# Patient Record
Sex: MALE | Race: WHITE | Employment: FULL TIME | URBAN - METROPOLITAN AREA
[De-identification: names, ages, dates, MRNs, and addresses within clinical notes are randomized per-mention and may not be internally consistent; named-entity substitution may affect disease eponyms.]

---

## 2024-05-06 ENCOUNTER — HOSPITAL ENCOUNTER (INPATIENT)
Age: 59
DRG: 560 | End: 2024-05-06
Attending: STUDENT IN AN ORGANIZED HEALTH CARE EDUCATION/TRAINING PROGRAM | Admitting: STUDENT IN AN ORGANIZED HEALTH CARE EDUCATION/TRAINING PROGRAM
Payer: COMMERCIAL

## 2024-05-06 DIAGNOSIS — S22.009S CLOSED FRACTURE OF THORACIC SPINE WITHOUT SPINAL CORD LESION, SEQUELA: Primary | ICD-10-CM

## 2024-05-06 PROCEDURE — 6360000002 HC RX W HCPCS: Performed by: STUDENT IN AN ORGANIZED HEALTH CARE EDUCATION/TRAINING PROGRAM

## 2024-05-06 PROCEDURE — 1280000000 HC REHAB R&B

## 2024-05-06 PROCEDURE — 6370000000 HC RX 637 (ALT 250 FOR IP): Performed by: STUDENT IN AN ORGANIZED HEALTH CARE EDUCATION/TRAINING PROGRAM

## 2024-05-06 RX ORDER — METHOCARBAMOL 500 MG/1
1000 TABLET, FILM COATED ORAL 3 TIMES DAILY
Status: DISPENSED | OUTPATIENT
Start: 2024-05-06

## 2024-05-06 RX ORDER — GABAPENTIN 300 MG/1
600 CAPSULE ORAL 3 TIMES DAILY
Status: DISCONTINUED | OUTPATIENT
Start: 2024-05-06 | End: 2024-05-08

## 2024-05-06 RX ORDER — ACETAMINOPHEN 325 MG/1
650 TABLET ORAL EVERY 4 HOURS PRN
Status: ACTIVE | OUTPATIENT
Start: 2024-05-06

## 2024-05-06 RX ORDER — POLYETHYLENE GLYCOL 3350 17 G/17G
17 POWDER, FOR SOLUTION ORAL 2 TIMES DAILY
Status: DISPENSED | OUTPATIENT
Start: 2024-05-06

## 2024-05-06 RX ORDER — CARVEDILOL 6.25 MG/1
6.25 TABLET ORAL 2 TIMES DAILY WITH MEALS
Status: DISPENSED | OUTPATIENT
Start: 2024-05-06

## 2024-05-06 RX ORDER — OXYCODONE HYDROCHLORIDE 5 MG/1
10 TABLET ORAL EVERY 4 HOURS PRN
Status: DISPENSED | OUTPATIENT
Start: 2024-05-06

## 2024-05-06 RX ORDER — BISACODYL 10 MG
10 SUPPOSITORY, RECTAL RECTAL DAILY PRN
Status: ACTIVE | OUTPATIENT
Start: 2024-05-06

## 2024-05-06 RX ORDER — SENNA AND DOCUSATE SODIUM 50; 8.6 MG/1; MG/1
1 TABLET, FILM COATED ORAL 2 TIMES DAILY
Status: DISPENSED | OUTPATIENT
Start: 2024-05-06

## 2024-05-06 RX ORDER — ACETAMINOPHEN 500 MG
1000 TABLET ORAL EVERY 8 HOURS SCHEDULED
Status: DISPENSED | OUTPATIENT
Start: 2024-05-06

## 2024-05-06 RX ORDER — ONDANSETRON 4 MG/1
4 TABLET, ORALLY DISINTEGRATING ORAL EVERY 8 HOURS PRN
Status: DISPENSED | OUTPATIENT
Start: 2024-05-06

## 2024-05-06 RX ORDER — OXYCODONE HYDROCHLORIDE 5 MG/1
5 TABLET ORAL EVERY 4 HOURS PRN
Status: DISPENSED | OUTPATIENT
Start: 2024-05-06

## 2024-05-06 RX ORDER — ENOXAPARIN SODIUM 100 MG/ML
40 INJECTION SUBCUTANEOUS 2 TIMES DAILY
Status: DISCONTINUED | OUTPATIENT
Start: 2024-05-06 | End: 2024-05-10

## 2024-05-06 RX ORDER — HYDRALAZINE HYDROCHLORIDE 10 MG/1
10 TABLET, FILM COATED ORAL EVERY 6 HOURS PRN
Status: ACTIVE | OUTPATIENT
Start: 2024-05-06

## 2024-05-06 RX ORDER — MECOBALAMIN 5000 MCG
5 TABLET,DISINTEGRATING ORAL NIGHTLY PRN
Status: DISPENSED | OUTPATIENT
Start: 2024-05-06

## 2024-05-06 RX ADMIN — OXYCODONE 10 MG: 5 TABLET ORAL at 17:38

## 2024-05-06 RX ADMIN — Medication 5 MG: at 21:06

## 2024-05-06 RX ADMIN — OXYCODONE 10 MG: 5 TABLET ORAL at 21:53

## 2024-05-06 RX ADMIN — METHOCARBAMOL 1000 MG: 500 TABLET ORAL at 21:02

## 2024-05-06 RX ADMIN — ACETAMINOPHEN 1000 MG: 500 TABLET ORAL at 21:01

## 2024-05-06 RX ADMIN — SENNOSIDES AND DOCUSATE SODIUM 1 TABLET: 50; 8.6 TABLET ORAL at 21:02

## 2024-05-06 RX ADMIN — POLYETHYLENE GLYCOL 3350 17 G: 17 POWDER, FOR SOLUTION ORAL at 21:01

## 2024-05-06 RX ADMIN — GABAPENTIN 600 MG: 300 CAPSULE ORAL at 21:02

## 2024-05-06 RX ADMIN — ENOXAPARIN SODIUM 40 MG: 100 INJECTION SUBCUTANEOUS at 21:05

## 2024-05-06 RX ADMIN — CARVEDILOL 6.25 MG: 6.25 TABLET, FILM COATED ORAL at 17:39

## 2024-05-06 ASSESSMENT — PAIN DESCRIPTION - DESCRIPTORS: DESCRIPTORS: TIGHTNESS

## 2024-05-06 ASSESSMENT — PAIN SCALES - GENERAL
PAINLEVEL_OUTOF10: 7
PAINLEVEL_OUTOF10: 8
PAINLEVEL_OUTOF10: 7

## 2024-05-06 ASSESSMENT — PAIN DESCRIPTION - LOCATION
LOCATION: BACK

## 2024-05-06 NOTE — PROGRESS NOTES
NURSING ASSESSMENT: ARU ADMISSION  [facility]    Patient:Jd Huertas     Rehab Dx/Hx: Closed fracture of thoracic spine without spinal cord lesion, sequela [S22.009S]   :1965  MRN:[id number]  Date of Admit: 2024  Room #: 0162/0162-01    Subjective:   Patient admitted to [id number] from  via stretcher.   Alert and oriented x4.  Oriented to room and call light system. Oriented to rehab routine and therapy schedules. Informed about care conferences and ordering of meals with PCA.    Drug / Medication Review:   Medications were reviewed by RN at time of admission  [x]  No potential or actual clinically significant medication issues were noted.   []  Potential or actual clinically significant medication issues were found and MD was notified. (Specified below if applicable)   []  Allergy to medication   []  Drug interactions (drug/drug, drug/food, drug/disease interactions)   []  Duplicate drug   []  Omission (drug missing from prescribed regimen)   []  Non adherence   []  Adverse reaction   []  Wrong patient, drug, dose, route, time error   []  Ineffective drug therapy    Section GG: Rolling Right and Left   [x]  Code 06, Independent: if the patient completes the activity by themself with no assistance from a helper.   []  Code 05, Setup or clean up assist: if the helper sets up or cleans up; patient completes activity   []  Code 04, Supervision or touching assist: If the helper provides verbal cues and/or touching/steadying and/or contact guard assistance as patient completes activity   []  Code 03, Partial/Moderate Assist: If the helper does LESS THAN HALF the effort. [place] lifts, holds, or supports trunk or limbs, but provides less than half the effort.   []  Code 02, Substantial/Maximal Assist: if the helper does MORE THAN HALF the effort. [place] lifts or holds trunk or limbs and provides more than half the effort.  []  Code 01,Dependent: If the helper does ALL of the effort.

## 2024-05-06 NOTE — PROGRESS NOTES
IRF TORI Admission Assessment  [facility] Acute Rehab Unit    Patient:Jd Huertas     Rehab Dx/Hx: Closed fracture of thoracic spine without spinal cord lesion, sequela [S22.009S]   :1965  MRN:[id number]  Date of Admit: 2024     Pain Effect on Sleep:  Over the past 5 days, how much of the time has pain made it hard for you to sleep at night?  []  0. Does not apply - I have not had any pain or hurting in the past 5 days  [x]  1. Rarely or not at all  []  2. Occasionally  []  3. Frequently  []  4. Almost constantly  []  8. Unable to answer    Over the past 5 days, how often have you limited your participation in rehabilitation therapy sessions due to pain?  []  0. Does not apply - I have not received rehabilitation therapy in the past 5 days  [x]  1. Rarely or not at all  []  2. Occasionally  []  3. Frequently  []  4. Almost constantly  []  8. Unable to answer    Pain Interference with Day-to-Day Activities:  Over the past 5 days, how often have you limited your day-to-day activities (excluding rehabilitation therapy session)?  [x]  1. Rarely or not at all  []  2. Occasionally  []  3. Frequently  []  4. Almost constantly  []  8. Unable to answer      High-Risk Drug Classes: Use and Indication   Is taking: Check if the pt is taking any medications by pharmacological classification  Indication noted: If column 1 is checked, check if there is an indication noted for all meds in the drug class Is taking  (check all that apply) Indication noted (check all that apply)   Antipsychotic [] []   Anticoagulant [x] [x]   Antibiotic [] []   Opioid [x] [x]   Antiplatelet [] []   Hypoglycemic (including insulin) [] []   None of the above [] []     Special Treatments, Procedures, and Programs   Check all of the following treatments, procedures, and programs that apply on admission.  On admission (check all that apply)   Cancer Treatments    A1. Chemotherapy []   A2. IV []   A3. Oral []   A10. Other []   B1. Radiation

## 2024-05-06 NOTE — PLAN OF CARE
ARU PATIENT TREATMENT PLAN  [facility]  [address] [place]  [address]  [phone]    Jd Huertas    : 1965  [facility]: [id number]  MRN: [id number]   PHYSICIAN:  Heavenly Luna MD  Primary Problem    Patient Active Problem List   Diagnosis    Closed fracture of thoracic spine without spinal cord lesion, sequela       Rehabilitation Diagnosis:     Closed fracture of thoracic spine without spinal cord lesion, sequela [S22.009S]       ADMIT DATE:2024    Patient Goals: \"to get strong enough to go home\"     Admitting Impairments: Pt. Admitted d/t spinal fracture resulting in Decreased functional mobility ;Decreased safe awareness;Decreased ADL status;Decreased endurance;Decreased strength;Decreased sensation;Decreased high-level IADLs   Barriers: level of assistance, availability of assistance, endurance, pain   Participation: Good     CARE PLAN     NURSING:  Jd Huertas while on this unit will:     [x] Be continent of bowel and bladder     [x] Have an adequate number of bowel movements  [] Urinate with no urinary retention >300ml in bladder  [] Complete bladder protocol with [place] removal  [] Maintain O2 SATs at ___%  [x] Have pain managed while on ARU       [] Be pain free by discharge   [x] Have no skin breakdown while on ARU  [] Have improved skin integrity via wound measurements  [x] Have no signs/symptoms of infection at the wound site  [x] Be free from injury during hospitalization   [x] Complete education with patient/family with understanding demonstrated for:  [x] Adjustment   [x] Other:   Nursing interventions may include bowel/bladder training, education for medical assistive devices, medication education, O2 saturation management, energy conservation, stress management techniques, fall prevention, alarms protocol, seating and positioning, skin/wound care, pressure relief instruction,dressing changes,  infection protection, DVT prophylaxis, and/or  SPV  Short Term Goal 5: Pt will complete simple IADL SPV :  Long Term Goals  Time Frame for Long Term Goals : 14 days (5/19/24)  Long Term Goal 1: Pt will complete functional/toilet transfer mod I  Long Term Goal 2: Pt will complete TB dressing mod I  Long Term Goal 3: Pt will complete TB bathing mod I  Long Term Goal 4: Pt will tolerate 7 min stand mod I  Long Term Goal 5: Pt will complete simple IADL mod I :    These goals were reviewed with this patient at the time of assessment and Jd Huertas is in agreement    Plan of Care:  Pt to be seen 5 out of 7 days per week, 90   mins (exact) per day for 14 days (exact)  Current Treatment Recommendations: Strengthening, Balance training, Functional mobility training, Endurance training, Safety education & training, Pain management, Patient/Caregiver education & training, Self-Care / ADL, Home management training      SPEECH THERAPY: Goals will be left blank if speech is not following this patient.  Goals:           CASE MANAGEMENT:  Goals:   Assist patient/family with discharge planning, patient/family counseling,   and coordination with insurance during ARU stay.    QIM / IRF TORI SCORES:  ITEM CURRENT SCORE GOAL   Eating CARE Score: 6 Discharge Goal: Independent   Oral Hygiene CARE Score: 4 Discharge Goal: Independent   Toileting Hygiene CARE Score: 4 Discharge Goal: Independent   Shower/Bathe Self CARE Score: 3 Discharge Goal: Independent   Upper Body Dressing CARE Score: 4 Discharge Goal: Independent   Lower Body Dressing CARE Score: 2 Discharge Goal: Independent   On/Off Footwear CARE Score: 2 Discharge Goal: Independent   Roll Left & Right CARE Score: 4 Discharge Goal: Independent   Sit to Lying  CARE Score: 3 Discharge Goal: Independent   Lying to Sitting EOB CARE Score: 3 Discharge Goal: Independent   Sit to Stand CARE Score: 3 Discharge Goal: Independent   Chair/Bed to Chair Transfer CARE Score: 3 Discharge Goal: Independent   Toilet Transfer CARE Score: 4

## 2024-05-07 LAB
ANION GAP SERPL CALCULATED.3IONS-SCNC: 5 MMOL/L (ref 3–16)
BASOPHILS # BLD: 0 K/UL (ref 0–0.2)
BASOPHILS NFR BLD: 0.3 %
BUN SERPL-MCNC: 20 MG/DL (ref 7–20)
CALCIUM SERPL-MCNC: 8.8 MG/DL (ref 8.3–10.6)
CHLORIDE SERPL-SCNC: 98 MMOL/L (ref 99–110)
CO2 SERPL-SCNC: 31 MMOL/L (ref 21–32)
CREAT SERPL-MCNC: 0.6 MG/DL (ref 0.9–1.3)
DEPRECATED RDW RBC AUTO: 15.1 % (ref 12.4–15.4)
EOSINOPHIL # BLD: 0.3 K/UL (ref 0–0.6)
EOSINOPHIL NFR BLD: 3 %
GFR SERPLBLD CREATININE-BSD FMLA CKD-EPI: >90 ML/MIN/{1.73_M2}
GLUCOSE SERPL-MCNC: 105 MG/DL (ref 70–99)
HCT VFR BLD AUTO: 36.3 % (ref 40.5–52.5)
HGB BLD-MCNC: 11.8 G/DL (ref 13.5–17.5)
LYMPHOCYTES # BLD: 1.6 K/UL (ref 1–5.1)
LYMPHOCYTES NFR BLD: 17.6 %
MCH RBC QN AUTO: 27.7 PG (ref 26–34)
MCHC RBC AUTO-ENTMCNC: 32.6 G/DL (ref 31–36)
MCV RBC AUTO: 84.8 FL (ref 80–100)
MONOCYTES # BLD: 1 K/UL (ref 0–1.3)
MONOCYTES NFR BLD: 11 %
NEUTROPHILS # BLD: 6.1 K/UL (ref 1.7–7.7)
NEUTROPHILS NFR BLD: 68.1 %
PLATELET # BLD AUTO: 630 K/UL (ref 135–450)
PMV BLD AUTO: 6.8 FL (ref 5–10.5)
POTASSIUM SERPL-SCNC: 4.9 MMOL/L (ref 3.5–5.1)
RBC # BLD AUTO: 4.28 M/UL (ref 4.2–5.9)
SODIUM SERPL-SCNC: 134 MMOL/L (ref 136–145)
WBC # BLD AUTO: 9 K/UL (ref 4–11)

## 2024-05-07 PROCEDURE — 97530 THERAPEUTIC ACTIVITIES: CPT

## 2024-05-07 PROCEDURE — 6370000000 HC RX 637 (ALT 250 FOR IP): Performed by: STUDENT IN AN ORGANIZED HEALTH CARE EDUCATION/TRAINING PROGRAM

## 2024-05-07 PROCEDURE — 97166 OT EVAL MOD COMPLEX 45 MIN: CPT

## 2024-05-07 PROCEDURE — 6360000002 HC RX W HCPCS: Performed by: STUDENT IN AN ORGANIZED HEALTH CARE EDUCATION/TRAINING PROGRAM

## 2024-05-07 PROCEDURE — 97116 GAIT TRAINING THERAPY: CPT

## 2024-05-07 PROCEDURE — 97162 PT EVAL MOD COMPLEX 30 MIN: CPT

## 2024-05-07 PROCEDURE — 97535 SELF CARE MNGMENT TRAINING: CPT

## 2024-05-07 PROCEDURE — 85025 COMPLETE CBC W/AUTO DIFF WBC: CPT

## 2024-05-07 PROCEDURE — 80048 BASIC METABOLIC PNL TOTAL CA: CPT

## 2024-05-07 PROCEDURE — 36415 COLL VENOUS BLD VENIPUNCTURE: CPT

## 2024-05-07 PROCEDURE — 1280000000 HC REHAB R&B

## 2024-05-07 RX ADMIN — ENOXAPARIN SODIUM 40 MG: 100 INJECTION SUBCUTANEOUS at 21:13

## 2024-05-07 RX ADMIN — ACETAMINOPHEN 1000 MG: 500 TABLET ORAL at 21:14

## 2024-05-07 RX ADMIN — CARVEDILOL 6.25 MG: 6.25 TABLET, FILM COATED ORAL at 16:41

## 2024-05-07 RX ADMIN — CARVEDILOL 6.25 MG: 6.25 TABLET, FILM COATED ORAL at 08:32

## 2024-05-07 RX ADMIN — OXYCODONE 10 MG: 5 TABLET ORAL at 09:45

## 2024-05-07 RX ADMIN — GABAPENTIN 600 MG: 300 CAPSULE ORAL at 21:14

## 2024-05-07 RX ADMIN — ACETAMINOPHEN 1000 MG: 500 TABLET ORAL at 05:41

## 2024-05-07 RX ADMIN — GABAPENTIN 600 MG: 300 CAPSULE ORAL at 14:22

## 2024-05-07 RX ADMIN — OXYCODONE HYDROCHLORIDE 5 MG: 5 TABLET ORAL at 21:17

## 2024-05-07 RX ADMIN — POLYETHYLENE GLYCOL 3350 17 G: 17 POWDER, FOR SOLUTION ORAL at 08:32

## 2024-05-07 RX ADMIN — POLYETHYLENE GLYCOL 3350 17 G: 17 POWDER, FOR SOLUTION ORAL at 21:14

## 2024-05-07 RX ADMIN — OXYCODONE 10 MG: 5 TABLET ORAL at 14:22

## 2024-05-07 RX ADMIN — ACETAMINOPHEN 1000 MG: 500 TABLET ORAL at 14:22

## 2024-05-07 RX ADMIN — SENNOSIDES AND DOCUSATE SODIUM 1 TABLET: 50; 8.6 TABLET ORAL at 08:33

## 2024-05-07 RX ADMIN — SENNOSIDES AND DOCUSATE SODIUM 1 TABLET: 50; 8.6 TABLET ORAL at 21:14

## 2024-05-07 RX ADMIN — Medication 5 MG: at 21:18

## 2024-05-07 RX ADMIN — METHOCARBAMOL 1000 MG: 500 TABLET ORAL at 14:22

## 2024-05-07 RX ADMIN — GABAPENTIN 600 MG: 300 CAPSULE ORAL at 08:33

## 2024-05-07 RX ADMIN — ENOXAPARIN SODIUM 40 MG: 100 INJECTION SUBCUTANEOUS at 08:33

## 2024-05-07 RX ADMIN — OXYCODONE 10 MG: 5 TABLET ORAL at 05:41

## 2024-05-07 RX ADMIN — OXYCODONE 10 MG: 5 TABLET ORAL at 01:48

## 2024-05-07 RX ADMIN — METHOCARBAMOL 1000 MG: 500 TABLET ORAL at 21:14

## 2024-05-07 RX ADMIN — METHOCARBAMOL 1000 MG: 500 TABLET ORAL at 08:33

## 2024-05-07 ASSESSMENT — PAIN SCALES - GENERAL
PAINLEVEL_OUTOF10: 8
PAINLEVEL_OUTOF10: 6
PAINLEVEL_OUTOF10: 7
PAINLEVEL_OUTOF10: 7
PAINLEVEL_OUTOF10: 6

## 2024-05-07 ASSESSMENT — PAIN DESCRIPTION - DESCRIPTORS
DESCRIPTORS: DISCOMFORT;SORE
DESCRIPTORS: DISCOMFORT
DESCRIPTORS: DISCOMFORT;SORE

## 2024-05-07 ASSESSMENT — PAIN DESCRIPTION - PAIN TYPE: TYPE: CHRONIC PAIN

## 2024-05-07 ASSESSMENT — PAIN - FUNCTIONAL ASSESSMENT: PAIN_FUNCTIONAL_ASSESSMENT: ACTIVITIES ARE NOT PREVENTED

## 2024-05-07 ASSESSMENT — PAIN DESCRIPTION - FREQUENCY: FREQUENCY: CONTINUOUS

## 2024-05-07 ASSESSMENT — PAIN DESCRIPTION - ONSET: ONSET: ON-GOING

## 2024-05-07 ASSESSMENT — PAIN DESCRIPTION - LOCATION
LOCATION: BACK

## 2024-05-07 NOTE — CARE COORDINATION
Case Management ARU Admission Assessment   [facility]    Patient:Jd Stoddard     Rehab Dx/Hx: Closed fracture of thoracic spine without spinal cord lesion, sequela [S22.009S]   :1965  MRN:[id number]  Date of Admit: 2024  Room #: 0162/0162-01       Objective:  met with the patient to complete initial assessment and review the role of Case Management while on the ARU. Patient educated on team conferences. Discussed family training with the patient/family on how it is encouraged on the unit. Order for \"discharge planning\" has been addressed.          Family Present: no   Primary : [facility] Decision Maker:   Primary Decision Maker: BECCA STODDARD - Child - [phone]    Secondary Decision Maker: YSABEL STODDARD  Child - [phone]   Current PCP:  No PCP listed will provide information       Admit date:  2024   Insurance: [facility] C-9   Precert required for SNF:  [] No       [] Yes   3 Night stay required: [] No       [] Yes   Admitting diagnosis: Closed fracture of thoracic spine without spinal cord lesion, sequela [S22.009S]       Current home situation: 59 yr old male. Dx:Closed fracture of thoracic spine without spinal cord lesion, sequela. Independent PLOF. Lives with son and daughter in a 2 level home with 1 step to enter.     DME at home: [] Walker     [] Cane       [] RTS        [] BSC      [] Shower Chair        [] O2       [] HHN     [] CPAP       [] BiPap      [] Hospital Bed       [] W/C        [] Other:    Medical concerns requiring training: Restrictions  Restrictions/Precautions: Fall Risk;Up as Tolerated;Surgical Protocols  Other position/activity restrictions: no BLT, no brace  Required Braces or Orthoses?: No   Medication concerns:  Require financial assistance with meds? [x] No       [] If yes, please explain:   [] Yes              Services prior to admission: none   Preference for HHC or OP Therapy: [] Home health       [x] Outpatient

## 2024-05-07 NOTE — PROGRESS NOTES
Occupational Therapy  Facility/Department: [facility]  Rehabilitation Occupational Therapy Evaluation       Date: 24  Patient Name: Jd Huertas       Room: 0162/0162-01  MRN: [id number]  Account: [id number]   : 1965  (59 y.o.) Gender: male     Referring Practitioner: Cheryl  Diagnosis: Closed fracture of thoracic spine without spinal cord lesion, sequela       Restrictions  Restrictions/Precautions: Fall Risk;Up as Tolerated;Surgical Protocols  Other position/activity restrictions: no BLT, no brace  Required Braces or Orthoses?: No    Subjective   Pt side-lying in bed and agreeable to OT evaluation.           Vitals  Temp: 97.7 °F (36.5 °C)  Pulse: 76  Respirations: 18  BP: (!) 143/76  Oxygen Therapy  SpO2: 97 %  O2 Device: None (Room air)       Objective     Cognition  Overall Cognitive Status: WFL  Orientation  Overall Orientation Status: Within Functional Limits  Orientation Level: Oriented X4   Perception  Overall Perceptual Status: WFL  Sensation  Overall Sensation Status: Impaired (L buttocks n/t)   ROM  LUE AROM (degrees)  LUE AROM : WFL  RUE AROM (degrees)  RUE AROM : WFL  LUE Strength  Gross LUE Strength: WNL  RUE Strength  Gross RUE Strength: WNL  Fine Motor Skills  Coordination  Movements Are Fluid And Coordinated: Yes       Hand Assessment     Functional Mobility  Transfers  Stand Step Transfers: Stand by assistance  Sit to stand: Stand by assistance  Stand to sit: Stand by assistance  Transfer Comments: with RW  Toilet Transfers  Toilet - Technique: Stand step  Equipment Used: Standard toilet  Toilet Transfer: Stand by assistance  Toilet Transfers Comments: with RW and grab bars  Shower Transfers  Shower - Transfer From: Walker  Shower - Transfer Type: To and From  Shower - Transfer To: Shower seat with back  Shower - Technique: Ambulating  Shower Transfers: Stand by assistance  Shower Transfers Comments: use of grab bars    Social/Functional History  Lives With: Son;Daughter  Type of

## 2024-05-07 NOTE — PROGRESS NOTES
Physical Therapy  Facility/Department: [facility]  Rehabilitation Physical Therapy Initial Assessment    NAME: Jd Huertas  : 1965 (59 y.o.)  MRN: [id number]  CODE STATUS: Full Code    Date of Service: 24  No past medical history on file.      Chart Reviewed: Yes  Patient assessed for rehabilitation services?: Yes  Family / Caregiver Present: No  Referring Practitioner: Heavenly Luna MD  Referral Date : 24  Diagnosis: Per chart: \"Jd Huertas is a 59 year old male without significant past medical history who presented to [facility] on 24 as a trauma after falling 15 feet onto a metal beam on his back while at work. He was found to have T5 and T8 compression fractures, T12 burst fracture with retropulsion causing spinal canal stenosis. Neurosurgery was consulted and on 24 he underwent T10-L2 PDFF. Incidentally a AAA was found requiring no surgical intervention. He was started on a BB and recommended for follow up CT in 6 months. CT head also showed small chronic lacunar infarct within the right caudate. Post operative course was complicated by ileus requiring NG for decompression which has since been removed. He was admitted to [facility] on 24 due to functional deficits below his baseline. Today he reports continued back pain which he reports is currently a 6 on a scale from 1-10. He reports some numbness over his left flank, buttock, and upper thigh. He denies focal weakness. His last bowel movement was Saturday. He denies urinary complaints. He lives in a 2 level house with 1 [facility] with his two teenaged children.\"  General Comment  Comments: RN cleared, pt in bed asleep on arrival. Agreeable to therapy.    Restrictions:  Restrictions/Precautions: Fall Risk;Up as Tolerated;Surgical Protocols  Position Activity Restriction  Spinal Precautions: No Bending;No Lifting;No Twisting  Other position/activity restrictions: no BLT, no brace     SUBJECTIVE  Subjective: Pt reports, \"I just want to get  PT, spinal precautions, safe transfers  Education Method: Verbal;Demonstration  Barriers to Learning: None  Education Outcome: Verbalized understanding;Continued education needed         Therapy Time   Individual Concurrent Group Co-treatment   Time In 1230         Time Out 1400         Minutes 90           Timed Code Treatment Minutes: 75 Minutes (15 min eval)       Maria C Finney, PT, 05/07/24 at 2:58 PM

## 2024-05-07 NOTE — PATIENT CARE CONFERENCE
[facility]  Inpatient Rehabilitation  Weekly Team Conference Note    Date: 2024  Patient Name: Jd Huertas        MRN: [id number]    : 1965  (59 y.o.)  Gender: male   Referring Practitioner: Heavenly Luna MD  Diagnosis: Per chart: \"Jd Huertas is a 59 year old male without significant past medical history who presented to [facility] on 24 as a trauma after falling 15 feet onto a metal beam on his back while at work. He was found to have T5 and T8 compression fractures, T12 burst fracture with retropulsion causing spinal canal stenosis. Neurosurgery was consulted and on 24 he underwent T10-L2 PDFF. Incidentally a AAA was found requiring no surgical intervention. He was started on a BB and recommended for follow up CT in 6 months. CT head also showed small chronic lacunar infarct within the right caudate. Post operative course was complicated by ileus requiring NG for decompression which has since been removed. He was admitted to [facility] on 24 due to functional deficits below his baseline. Today he reports continued back pain which he reports is currently a 6 on a scale from 1-10. He reports some numbness over his left flank, buttock, and upper thigh. He denies focal weakness. His last bowel movement was Saturday. He denies urinary complaints. He lives in a 2 level house with 1 MAX with his two teenaged children.\"      Interventions to be utilized toward barriers to discharge, per discipline:  NURSING  Nursing observed barriers to dc: Pain, Limited family support, Lower extremity weakness, and Medication managment  Nursing interventions: medication management, assist with ADL's, education  Family Education: No  Fall Risk:  Yes    Stay with me?: No    PHYSICAL THERAPY  Physical therapy observed barriers to dc:    Baseline: Independent with mobility, 2 story home, 1 MAX  Current level: Min A bed mobility & transfers, Mod A to walk without RW 20 ft, Min A with RW

## 2024-05-07 NOTE — H&P
Patient: Jd Huertas  [id number]  Date: 5/7/2024      Chief Complaint: back pain    History of Present Illness/Hospital Course:  Jd Huertas is a 59 year old male without significant past medical history who presented to [facility] on 4/22/24 as a trauma after falling 15 feet onto a metal beam on his back while at work. He was found to have T5 and T8 compression fractures, T12 burst fracture with retropulsion causing spinal canal stenosis. Neurosurgery was consulted and on 4/24/24 he underwent T10-L2 PDFF. Incidentally a AAA was found requiring no surgical intervention. He was started on a BB and recommended for follow up CT in 6 months. CT head also showed small chronic lacunar infarct within the right caudate. Post operative course was complicated by ileus requiring NG for decompression which has since been removed. He was admitted to [facility] on 5/6/24 due to functional deficits below his baseline. Today he reports continued back pain which he reports is currently a 6 on a scale from 1-10. He reports some numbness over his left flank, buttock, and upper thigh. He denies focal weakness. His last bowel movement was Saturday. He denies urinary complaints. He lives in a 2 level house with 1 [facility] with his two teenaged children.     Prior Level of Function:  Independent for self care, indoor mobility, stairs, functional cognition     Current Level of Function:  Supervision to roll left and right  Mod assist for sit to lying, lying to sitting on side of bed, sit to stand, chair/bed transfer, walk 10 feet  Max assist for lower body dressing     has no past medical history on file.     has no past surgical history on file.     reports that he has been smoking cigarettes. He started smoking about 30 years ago. He has a 30.0 pack-year smoking history. He has never used smokeless tobacco. He reports that he does not drink alcohol and does not use drugs.    family history is not on file.    Current Facility-Administered Medications  septum: Agitated saline contrast study at baseline or with     provocation, shows a trivialright-to-left atrial level shunt.   - Pericardium, extracardiac: A small pericardial effusion is identified.     Most recent EKG revealed NSR.     IMAGING    LLE Venous Duplex 5/3/24  Normal LLE venous duplex exam.     XR Chest 4/30/24  Stable airspace disease status post extubation.     MRI T and L Spine WO contrast 4/22/24  Thoracic vertebral body disc space height and alignment maintained T1-T4, T6-T11. A wedge compression deformity T5 similar loss of height compared to CT, minimal retropulsion adjacent to interspace dorsally with thecal sac but no spinal cord compression.   T12 burst fracture similar to MR, with 60% loss of height midline, retropulsion of dorsal cortex approximately 6 mm with chiefly left lateral thecal sac compression and minimal spinal cord effacement.   Lumbar vertebrae and disc spaces normal.     The above laboratory data have been reviewed.   The above imaging data have been reviewed.  The above medical testing have been reviewed.    Body mass index is 25.4 kg/m².    Barriers to Discharge: level of assistance, availability of assistance, endurance, pain    POST ADMISSION PHYSICIAN EVALUATION  The patient has agreed to being admitted to our comprehensive inpatient rehabilitation facility consisting of at least 180 minutes of therapy a day, 5 out of 7 days a week.     The patient/family has a good understanding of our discharge process. The patient has potential to make improvement and is in need of at least two of the following multidisciplinary therapies including but not limited to physical, occupational, respiratory, and speech, nutritional services, wound care, and prosthetics and orthotics. Given the patients complex condition and risk of further medical complications, rehabilitation services cannot be safely provided at a lower level of care such as a skilled nursing facility. I have compared the  provided prn.     PPX  DVT: lovenox  GI: not indicated    Follow up appointments: Neurosurgery, CT Surgery, PCP    Heavenly Luna MD 5/7/2024, 10:07 AM

## 2024-05-08 ENCOUNTER — APPOINTMENT (OUTPATIENT)
Dept: GENERAL RADIOLOGY | Age: 59
DRG: 560 | End: 2024-05-08
Attending: STUDENT IN AN ORGANIZED HEALTH CARE EDUCATION/TRAINING PROGRAM
Payer: COMMERCIAL

## 2024-05-08 LAB
ALBUMIN SERPL-MCNC: 3.4 G/DL (ref 3.4–5)
ALBUMIN/GLOB SERPL: 1 {RATIO} (ref 1.1–2.2)
ALP SERPL-CCNC: 158 U/L (ref 40–129)
ALT SERPL-CCNC: 32 U/L (ref 10–40)
ANION GAP SERPL CALCULATED.3IONS-SCNC: 8 MMOL/L (ref 3–16)
AST SERPL-CCNC: 21 U/L (ref 15–37)
BASOPHILS # BLD: 0 K/UL (ref 0–0.2)
BASOPHILS NFR BLD: 0.3 %
BILIRUB SERPL-MCNC: <0.2 MG/DL (ref 0–1)
BUN SERPL-MCNC: 20 MG/DL (ref 7–20)
CALCIUM SERPL-MCNC: 8.9 MG/DL (ref 8.3–10.6)
CHLORIDE SERPL-SCNC: 95 MMOL/L (ref 99–110)
CO2 SERPL-SCNC: 28 MMOL/L (ref 21–32)
CREAT SERPL-MCNC: 0.6 MG/DL (ref 0.9–1.3)
DEPRECATED RDW RBC AUTO: 15.1 % (ref 12.4–15.4)
EOSINOPHIL # BLD: 0.2 K/UL (ref 0–0.6)
EOSINOPHIL NFR BLD: 2 %
GFR SERPLBLD CREATININE-BSD FMLA CKD-EPI: >90 ML/MIN/{1.73_M2}
GLUCOSE SERPL-MCNC: 127 MG/DL (ref 70–99)
HCT VFR BLD AUTO: 36.4 % (ref 40.5–52.5)
HGB BLD-MCNC: 12.1 G/DL (ref 13.5–17.5)
LIPASE SERPL-CCNC: 22 U/L (ref 13–60)
LYMPHOCYTES # BLD: 1.4 K/UL (ref 1–5.1)
LYMPHOCYTES NFR BLD: 14.4 %
MCH RBC QN AUTO: 28 PG (ref 26–34)
MCHC RBC AUTO-ENTMCNC: 33.4 G/DL (ref 31–36)
MCV RBC AUTO: 83.8 FL (ref 80–100)
MONOCYTES # BLD: 0.7 K/UL (ref 0–1.3)
MONOCYTES NFR BLD: 7.6 %
NEUTROPHILS # BLD: 7.2 K/UL (ref 1.7–7.7)
NEUTROPHILS NFR BLD: 75.7 %
PLATELET # BLD AUTO: 688 K/UL (ref 135–450)
PMV BLD AUTO: 7.3 FL (ref 5–10.5)
POTASSIUM SERPL-SCNC: 4.2 MMOL/L (ref 3.5–5.1)
PROT SERPL-MCNC: 6.9 G/DL (ref 6.4–8.2)
RBC # BLD AUTO: 4.34 M/UL (ref 4.2–5.9)
SODIUM SERPL-SCNC: 131 MMOL/L (ref 136–145)
WBC # BLD AUTO: 9.5 K/UL (ref 4–11)

## 2024-05-08 PROCEDURE — 97530 THERAPEUTIC ACTIVITIES: CPT

## 2024-05-08 PROCEDURE — 97110 THERAPEUTIC EXERCISES: CPT

## 2024-05-08 PROCEDURE — 80053 COMPREHEN METABOLIC PANEL: CPT

## 2024-05-08 PROCEDURE — 1280000000 HC REHAB R&B

## 2024-05-08 PROCEDURE — 6370000000 HC RX 637 (ALT 250 FOR IP): Performed by: STUDENT IN AN ORGANIZED HEALTH CARE EDUCATION/TRAINING PROGRAM

## 2024-05-08 PROCEDURE — 6360000002 HC RX W HCPCS: Performed by: STUDENT IN AN ORGANIZED HEALTH CARE EDUCATION/TRAINING PROGRAM

## 2024-05-08 PROCEDURE — 36415 COLL VENOUS BLD VENIPUNCTURE: CPT

## 2024-05-08 PROCEDURE — 74018 RADEX ABDOMEN 1 VIEW: CPT

## 2024-05-08 PROCEDURE — 97535 SELF CARE MNGMENT TRAINING: CPT

## 2024-05-08 PROCEDURE — 85025 COMPLETE CBC W/AUTO DIFF WBC: CPT

## 2024-05-08 PROCEDURE — 97116 GAIT TRAINING THERAPY: CPT

## 2024-05-08 PROCEDURE — 83690 ASSAY OF LIPASE: CPT

## 2024-05-08 RX ORDER — GABAPENTIN 400 MG/1
800 CAPSULE ORAL 3 TIMES DAILY
Status: DISPENSED | OUTPATIENT
Start: 2024-05-08

## 2024-05-08 RX ORDER — SIMETHICONE 80 MG
80 TABLET,CHEWABLE ORAL EVERY 6 HOURS PRN
Status: DISPENSED | OUTPATIENT
Start: 2024-05-08

## 2024-05-08 RX ADMIN — SIMETHICONE 80 MG: 80 TABLET, CHEWABLE ORAL at 11:14

## 2024-05-08 RX ADMIN — METHOCARBAMOL 1000 MG: 500 TABLET ORAL at 14:26

## 2024-05-08 RX ADMIN — GABAPENTIN 800 MG: 400 CAPSULE ORAL at 14:26

## 2024-05-08 RX ADMIN — POLYETHYLENE GLYCOL 3350 17 G: 17 POWDER, FOR SOLUTION ORAL at 21:38

## 2024-05-08 RX ADMIN — ENOXAPARIN SODIUM 40 MG: 100 INJECTION SUBCUTANEOUS at 08:29

## 2024-05-08 RX ADMIN — ENOXAPARIN SODIUM 40 MG: 100 INJECTION SUBCUTANEOUS at 21:38

## 2024-05-08 RX ADMIN — GABAPENTIN 800 MG: 400 CAPSULE ORAL at 21:37

## 2024-05-08 RX ADMIN — METHOCARBAMOL 1000 MG: 500 TABLET ORAL at 08:29

## 2024-05-08 RX ADMIN — OXYCODONE 10 MG: 5 TABLET ORAL at 12:25

## 2024-05-08 RX ADMIN — ACETAMINOPHEN 1000 MG: 500 TABLET ORAL at 14:26

## 2024-05-08 RX ADMIN — MAGNESIUM HYDROXIDE 30 ML: 400 SUSPENSION ORAL at 08:28

## 2024-05-08 RX ADMIN — CARVEDILOL 6.25 MG: 6.25 TABLET, FILM COATED ORAL at 17:16

## 2024-05-08 RX ADMIN — CARVEDILOL 6.25 MG: 6.25 TABLET, FILM COATED ORAL at 08:29

## 2024-05-08 RX ADMIN — Medication 5 MG: at 21:37

## 2024-05-08 RX ADMIN — GABAPENTIN 600 MG: 300 CAPSULE ORAL at 08:28

## 2024-05-08 RX ADMIN — OXYCODONE 10 MG: 5 TABLET ORAL at 01:59

## 2024-05-08 RX ADMIN — SENNOSIDES AND DOCUSATE SODIUM 1 TABLET: 50; 8.6 TABLET ORAL at 21:38

## 2024-05-08 RX ADMIN — ACETAMINOPHEN 1000 MG: 500 TABLET ORAL at 21:37

## 2024-05-08 RX ADMIN — SENNOSIDES AND DOCUSATE SODIUM 1 TABLET: 50; 8.6 TABLET ORAL at 08:28

## 2024-05-08 RX ADMIN — METHOCARBAMOL 1000 MG: 500 TABLET ORAL at 21:37

## 2024-05-08 RX ADMIN — ACETAMINOPHEN 1000 MG: 500 TABLET ORAL at 05:50

## 2024-05-08 RX ADMIN — OXYCODONE 10 MG: 5 TABLET ORAL at 08:28

## 2024-05-08 RX ADMIN — POLYETHYLENE GLYCOL 3350 17 G: 17 POWDER, FOR SOLUTION ORAL at 08:28

## 2024-05-08 RX ADMIN — OXYCODONE HYDROCHLORIDE 5 MG: 5 TABLET ORAL at 17:17

## 2024-05-08 RX ADMIN — OXYCODONE 10 MG: 5 TABLET ORAL at 21:37

## 2024-05-08 ASSESSMENT — PAIN DESCRIPTION - ORIENTATION: ORIENTATION: LEFT;RIGHT

## 2024-05-08 ASSESSMENT — PAIN SCALES - GENERAL
PAINLEVEL_OUTOF10: 9
PAINLEVEL_OUTOF10: 6
PAINLEVEL_OUTOF10: 5
PAINLEVEL_OUTOF10: 7
PAINLEVEL_OUTOF10: 6
PAINLEVEL_OUTOF10: 10
PAINLEVEL_OUTOF10: 7

## 2024-05-08 ASSESSMENT — PAIN DESCRIPTION - ONSET: ONSET: ON-GOING

## 2024-05-08 ASSESSMENT — PAIN - FUNCTIONAL ASSESSMENT: PAIN_FUNCTIONAL_ASSESSMENT: PREVENTS OR INTERFERES WITH MANY ACTIVE NOT PASSIVE ACTIVITIES

## 2024-05-08 ASSESSMENT — PAIN DESCRIPTION - LOCATION
LOCATION: LEG
LOCATION: BACK

## 2024-05-08 ASSESSMENT — PAIN DESCRIPTION - FREQUENCY: FREQUENCY: CONTINUOUS

## 2024-05-08 ASSESSMENT — PAIN DESCRIPTION - PAIN TYPE: TYPE: CHRONIC PAIN

## 2024-05-08 NOTE — PLAN OF CARE
Problem: Safety - Adult  Goal: Free from fall injury  Outcome: Progressing  Flowsheets (Taken 5/8/2024 1556)  Free From Fall Injury:   Instruct family/caregiver on patient safety   Based on caregiver fall risk screen, instruct family/caregiver to ask for assistance with transferring infant if caregiver noted to have fall risk factors     Problem: Pain  Goal: Verbalizes/displays adequate comfort level or baseline comfort level  Outcome: Progressing  Flowsheets (Taken 5/8/2024 1556)  Verbalizes/displays adequate comfort level or baseline comfort level:   Encourage patient to monitor pain and request assistance   Administer analgesics based on type and severity of pain and evaluate response   Consider cultural and social influences on pain and pain management   Assess pain using appropriate pain scale   Implement non-pharmacological measures as appropriate and evaluate response   Notify Licensed Independent Practitioner if interventions unsuccessful or patient reports new pain

## 2024-05-08 NOTE — PROGRESS NOTES
Patient moaning loudly, audible in the hallway with door shut. When nurse entered room patient was lying flat in the bed with knees bent and legs in the air, rocking back and forth, continued to moan. Complained of leg pain, rated a \"9\", medicated with prn oxy 10mg per order and heat pack applied with minimal effectiveness noted, patient continued to rock back and forth and moan at times.

## 2024-05-08 NOTE — PROGRESS NOTES
Physical Therapy  Facility/Department: [facility]  Rehabilitation Physical Therapy Treatment Note    NAME: Jd Huertas  : 1965 (59 y.o.)  MRN: [id number]  CODE STATUS: Full Code    Date of Service: 24       Restrictions:  Restrictions/Precautions: Fall Risk, Up as Tolerated, Surgical Protocols  Position Activity Restriction  Spinal Precautions: No Bending, No Lifting, No Twisting  Other position/activity restrictions: no BLT, no brace     SUBJECTIVE  Subjective  Subjective: Pt in bed, reports having a BM and feeling much better than earlier today.        OBJECTIVE  Cognition  Overall Cognitive Status: WFL  Orientation  Overall Orientation Status: Within Functional Limits  Orientation Level: Oriented X4    Functional Mobility  Bed Mobility  Overall Assistance Level:  (Pt rolling each direction to reposition for pain management, MOD I)  Supine to Sit  Assistance Level: Stand by assist (HOB elevated ~30*)  Sit to Stand  Assistance Level: Stand by assist  Skilled Clinical Factors: using RW, cues for safe foot placement      Environmental Mobility  Ambulation  Surface: Uneven surface  Device: Rolling walker  Distance: 16 ft, 2 varied surfaces with slow kerwin, cues to widen base of support       PT Exercises  Exercise Equipment: Endurance training & BLE strengthening on the SCI Fit, x 10 continuous minutes, self pace without resistance.      ASSESSMENT/PROGRESS TOWARDS GOALS       Assessment  Assessment: Pt with no complaints this afternoon, requires extra time for seated rest breaks between activity and does not tolerate sitting unsupported bc of back pain. Recommend IP rehab to address deficits in strength, balance, and endurance prior to a safe d/c home.  Activity Tolerance: Patient limited by pain;Patient tolerated treatment well;Patient limited by endurance;Patient limited by fatigue  Discharge Recommendations: Outpatient PT;Home with Home health PT;Continue to assess pending progress  PT Equipment  more loopy/sleepy feeling, potentially due to pain meds). Ended session in bed    Vitals: 78 bpm, 98%, 126/77 mmHg post gait , 117/73 post ther ex    Bed mobility:SBA sit to supine    Transfers: STS 6x during session with cues for hand and foot placement, CGA to MIN A at EOS    Gait: 196 ft, 155 ft, 90 ft CGA with WC follow and RW    Stairs: step ups onto 8\" step with RW for support and CGA, 10x R and L with rest in between     Exercises: standing heel raises 10x2, 2# ankle weights for marches 15x, hip abd 10x with ankle weights (pt requested to rest due to increase in nausea and light headedness, BP WFL)  Rest required in between each exercise due to back pain    Therapy Time   Individual Concurrent Group Co-treatment   Time In 1430         Time Out 1500         Minutes 30           Second Session Therapy Time: Karolyn Guerrero PT   Individual Concurrent Group Co-treatment   Time In 1500         Time Out 1600         Minutes 60               Timed Code Treatment Minutes: 90 Minutes       Maria C Finney, PT, 05/08/24 at 3:13 PM

## 2024-05-08 NOTE — PROGRESS NOTES
Occupational Therapy  Facility/Department: [facility]  Rehabilitation Occupational Therapy Daily Treatment Note    Date: 24  Patient Name: Jd Huertas       Room: 0162/0162-01  MRN: [id number]  Account: [id number]   : 1965  (59 y.o.) Gender: male                    Past Medical History:  has no past medical history on file.  Past Surgical History:   has no past surgical history on file.    Restrictions  Restrictions/Precautions: Fall Risk, Up as Tolerated, Surgical Protocols  Other position/activity restrictions: no BLT, no brace  Required Braces or Orthoses?: No    Subjective  Subjective: Pt in bed at OT arrival. Pain: 8/10, aching. Vitals WFL   Restrictions/Precautions: Fall Risk;Up as Tolerated;Surgical Protocols             Objective     Cognition  Overall Cognitive Status: WFL  Orientation  Overall Orientation Status: Within Functional Limits  Orientation Level: Oriented X4         ADL     Declines need for ADLs         OT Exercises  Resistive Exercises: 3# dumbbell, x 20 reps: Bicep curls (x2 sets), IR/ER, supination/pronation, wrist flexion/extension (x2 sets)  Performed in semi supine d/t pain     Assessment  Assessment  Assessment: Pt limited during session by pain L hip. Pt educated on importance of OOB mobility, but requests to remain in bed during BUE ex. Pt performs BUE ex to incr strength and activity tolerance for ADLs and fxl transfers. Pt will cont to benefit from skilled OT in ARU to improve IND with ADLs. Pt provided with ice pack at end of session and assisted with repositioning for comfort. Cont OT POC   Activity Tolerance: Patient limited by pain  Discharge Recommendations: Home with assist PRN;Continue to assess pending progress  OT Equipment Recommendations  Equipment Needed: Yes  Mobility Devices: ADL Assistive Devices  ADL Assistive Devices: Transfer Tub Bench;Sock-Aid Hard;Reacher;Leg ;Long-handled Sponge;Hand-held Shower  Safety Devices  Safety Devices in place:

## 2024-05-08 NOTE — PROGRESS NOTES
Sit>stand:  Assistance Level: Stand by assist  Skilled Clinical Factors: using RW, cues for safe foot placement  Stand>sit:     Bed<>chair     Stand Pivot:     Lateral transfer:     Car transfer:     Ambulation:  Surface: Uneven surface  Device: Rolling walker  Distance: 16 ft, 2 varied surfaces with slow kerwin, cues to widen base of support  Stairs:     Curb:     Wheelchair:       Occupational therapy:   Feeding     Grooming/Oral Hygiene     UE Bathing     LE Bathing     UE Dressing     LE Dressing     Putting On/Taking Off Footwear     Toileting       Speech therapy:    ADULT DIET; Regular    Body mass index is 25.4 kg/m².    Assessment and Plan:  Jd Huertas is a 59 year old male without significant past medical history who presented to [facility] on 4/22/24 as a trauma after falling 15 feet onto a metal beam on his back while at work, found to have multiple thoracic and lumbar spinal fractures s/p T10-L2 PDFF. He was admitted to [facility] on 5/6/24 due to functional deficits below his baseline.      Comminuted T5 Vertebral Body Fracture  Retrolisthesis T5/6 with suspected longitudinal ligamentous injury  T8 Anterior Compression Fracture  T12 Burst Fx w/ Retropulsion and associated canal stenosis   Right Paravertebral Hematoma along T11-12  T12 TP Fracture  L1/2 TP Fractures  - s/p T10-L2 PDFF  - no brace needed  - spinal precautions  - incision open to air  - multimodal pain control, increased gabapentin   - PT, OT     R 10-12 rib fractures (mild displacement)   - not a candidate for SSRF  - IS    Abdominal and side pain  - suspect neuropathic referred pain  - XR abd with moderate stool burden, continue bowel regimen   - increased gabapentin     Hyponatremia  - repeat lab tomorrow     Ascending aortic aneurysm  - HR and BP control with coreg  - repeat CTA chest in 6 months with clinic follow up Dr. Patel     Resolved Hospital Problems     Ileus, resolved  - continue bowel regimen      Bowels: adjust medications as  needed for regular bowel movements     Bladder: Check PVR x 3.  IMC if PVR > 200ml or if any volume is > 500 ml.      Sleep: melatonin provided prn.      PPX  DVT: lovenox  GI: not indicated     Follow up appointments: Neurosurgery, CT Surgery, PCP    Therapy progress: limited by pain  Services: OP PT  EDOD: 5/17    Interdisciplinary team conference was held today with entire rehab treatment team including PT, OT, SLP (if applicable), Dietician, RN, and SW. Discussion focused on progress toward rehab goals and discharge planning. Making progress. Barriers include level of assistance, availability of assistance, endurance, pain, comorbidities. We as a medical team, and I as the physician , made a plan to work on these barriers to facilitate safe discharge. Plan will be presented to patient/family (if available).     Heavenly Luna MD 5/8/2024, 3:29 PM

## 2024-05-08 NOTE — PROGRESS NOTES
Comprehensive Nutrition Assessment    Type and Reason for Visit:  Initial    Nutrition Recommendations/Plan:   Regular diet, monitor and encourage intake  Add Ensure High Protein QD, encourage acceptance     Malnutrition Assessment:  Malnutrition Status:  Insufficient data (05/08/24 1345)    Context:  Acute Illness       Nutrition Assessment:    Assessed as new ARU admit. Patient admitted following acute stay after fall at work resulting in multiple spinal fractures. Patient had post op ileus requiring NGT for decompression; now removed. Patient is currently tolerating a regular diet with good po intake. Pt with other diciplines at visit, will FU for full interview. Per EMR patient has been tolerating regular diet with good PO intake of two meals.    Nutrition Related Findings:    +BM 5/4, Wound Type: None       Current Nutrition Intake & Therapies:    Average Meal Intake: %  Average Supplements Intake: None Ordered  ADULT DIET; Regular    Anthropometric Measures:  Height: 180.3 cm (5' 11\")  Ideal Body Weight (IBW): 172 lbs (78 kg)    Current Body Weight: 82.6 kg (182 lb 1.6 oz),   IBW. Weight Source: Bed Scale  Current BMI (kg/m2): 25.4    Estimated Daily Nutrient Needs:  Energy Requirements Based On: Kcal/kg  Weight Used for Energy Requirements: Current  Energy (kcal/day): 7201-3675 (22-25 kcal/kg)  Weight Used for Protein Requirements: Current  Protein (g/day):  (1-1.3 gm/kg)     Fluid (ml/day): 4226-3083 ml    Nutrition Diagnosis:   Increased nutrient needs related to acute injury/trauma as evidenced by  (s/p fall and spinal surgery)    Nutrition Interventions:   Food and/or Nutrient Delivery: Continue Current Diet, Start Oral Nutrition Supplement  Nutrition Education/Counseling: No recommendation at this time     Goals:  Goals: PO intake 75% or greater, prior to discharge     Nutrition Monitoring and Evaluation:   Food/Nutrient Intake Outcomes: Food and Nutrient Intake  Physical Signs/Symptoms  Outcomes: Biochemical Data, Nutrition Focused Physical Findings    Brittni Cooper RD  Contact: Office: [phone]; Aj: [phone]

## 2024-05-08 NOTE — PLAN OF CARE
Problem: Pain  Goal: Verbalizes/displays adequate comfort level or baseline comfort level  5/8/2024 0001 by Leigh Ann Amador, RN  Outcome: Progressing  5/7/2024 1135 by Daniela Roberts, RN  Outcome: Progressing

## 2024-05-08 NOTE — CARE COORDINATION
Weekly team care conference with interdisciplinary team on:05/08/24     Chart reviewed for length of stay. IP day # 2  Unit:  ARU  Diagnosis and current status as per MD progress: Closed fracture of thoracic spine without spinal cord lesion,   Consultants Following: n/a  Planned Discharge Date: 05/17/24  Durable medical equipment needed: RW. TTB  Discharge Plan: Outpatient Physical Therapy       59 yr old male. Dx:Closed fracture of thoracic spine without spinal cord lesion, sequela. Independent PLOF. Lives with son and daughter in a 2 level home with 1 step to enter.  CM will fill out C9 form for [facility] for outpatient PT and DME of RW and TTB. [facility], will need approved prior to delivering equipment.    Anahy Sands RN

## 2024-05-09 LAB
ANION GAP SERPL CALCULATED.3IONS-SCNC: 10 MMOL/L (ref 3–16)
BASOPHILS # BLD: 0.1 K/UL (ref 0–0.2)
BASOPHILS NFR BLD: 1.5 %
BUN SERPL-MCNC: 21 MG/DL (ref 7–20)
CALCIUM SERPL-MCNC: 8.8 MG/DL (ref 8.3–10.6)
CHLORIDE SERPL-SCNC: 97 MMOL/L (ref 99–110)
CO2 SERPL-SCNC: 28 MMOL/L (ref 21–32)
CREAT SERPL-MCNC: 0.6 MG/DL (ref 0.9–1.3)
DEPRECATED RDW RBC AUTO: 15 % (ref 12.4–15.4)
EOSINOPHIL # BLD: 0.2 K/UL (ref 0–0.6)
EOSINOPHIL NFR BLD: 2.8 %
GFR SERPLBLD CREATININE-BSD FMLA CKD-EPI: >90 ML/MIN/{1.73_M2}
GLUCOSE SERPL-MCNC: 104 MG/DL (ref 70–99)
HCT VFR BLD AUTO: 36.4 % (ref 40.5–52.5)
HGB BLD-MCNC: 12 G/DL (ref 13.5–17.5)
LYMPHOCYTES # BLD: 1.4 K/UL (ref 1–5.1)
LYMPHOCYTES NFR BLD: 17.2 %
MCH RBC QN AUTO: 27.7 PG (ref 26–34)
MCHC RBC AUTO-ENTMCNC: 32.8 G/DL (ref 31–36)
MCV RBC AUTO: 84.3 FL (ref 80–100)
MONOCYTES # BLD: 1 K/UL (ref 0–1.3)
MONOCYTES NFR BLD: 12.2 %
NEUTROPHILS # BLD: 5.3 K/UL (ref 1.7–7.7)
NEUTROPHILS NFR BLD: 66.3 %
PLATELET # BLD AUTO: 739 K/UL (ref 135–450)
PMV BLD AUTO: 7.2 FL (ref 5–10.5)
POTASSIUM SERPL-SCNC: 4.1 MMOL/L (ref 3.5–5.1)
RBC # BLD AUTO: 4.32 M/UL (ref 4.2–5.9)
SODIUM SERPL-SCNC: 135 MMOL/L (ref 136–145)
WBC # BLD AUTO: 8 K/UL (ref 4–11)

## 2024-05-09 PROCEDURE — 80048 BASIC METABOLIC PNL TOTAL CA: CPT

## 2024-05-09 PROCEDURE — 1280000000 HC REHAB R&B

## 2024-05-09 PROCEDURE — 97110 THERAPEUTIC EXERCISES: CPT

## 2024-05-09 PROCEDURE — 97530 THERAPEUTIC ACTIVITIES: CPT

## 2024-05-09 PROCEDURE — 36415 COLL VENOUS BLD VENIPUNCTURE: CPT

## 2024-05-09 PROCEDURE — 97116 GAIT TRAINING THERAPY: CPT

## 2024-05-09 PROCEDURE — 6360000002 HC RX W HCPCS: Performed by: STUDENT IN AN ORGANIZED HEALTH CARE EDUCATION/TRAINING PROGRAM

## 2024-05-09 PROCEDURE — 6370000000 HC RX 637 (ALT 250 FOR IP): Performed by: STUDENT IN AN ORGANIZED HEALTH CARE EDUCATION/TRAINING PROGRAM

## 2024-05-09 PROCEDURE — 85025 COMPLETE CBC W/AUTO DIFF WBC: CPT

## 2024-05-09 RX ADMIN — OXYCODONE 10 MG: 5 TABLET ORAL at 11:33

## 2024-05-09 RX ADMIN — GABAPENTIN 800 MG: 400 CAPSULE ORAL at 07:47

## 2024-05-09 RX ADMIN — POLYETHYLENE GLYCOL 3350 17 G: 17 POWDER, FOR SOLUTION ORAL at 07:47

## 2024-05-09 RX ADMIN — ACETAMINOPHEN 1000 MG: 500 TABLET ORAL at 06:53

## 2024-05-09 RX ADMIN — METHOCARBAMOL 1000 MG: 500 TABLET ORAL at 14:01

## 2024-05-09 RX ADMIN — POLYETHYLENE GLYCOL 3350 17 G: 17 POWDER, FOR SOLUTION ORAL at 20:46

## 2024-05-09 RX ADMIN — METHOCARBAMOL 1000 MG: 500 TABLET ORAL at 20:39

## 2024-05-09 RX ADMIN — ENOXAPARIN SODIUM 40 MG: 100 INJECTION SUBCUTANEOUS at 20:46

## 2024-05-09 RX ADMIN — CARVEDILOL 6.25 MG: 6.25 TABLET, FILM COATED ORAL at 16:32

## 2024-05-09 RX ADMIN — GABAPENTIN 800 MG: 400 CAPSULE ORAL at 14:01

## 2024-05-09 RX ADMIN — SENNOSIDES AND DOCUSATE SODIUM 1 TABLET: 50; 8.6 TABLET ORAL at 07:47

## 2024-05-09 RX ADMIN — ENOXAPARIN SODIUM 40 MG: 100 INJECTION SUBCUTANEOUS at 07:48

## 2024-05-09 RX ADMIN — ONDANSETRON 4 MG: 4 TABLET, ORALLY DISINTEGRATING ORAL at 10:13

## 2024-05-09 RX ADMIN — SENNOSIDES AND DOCUSATE SODIUM 1 TABLET: 50; 8.6 TABLET ORAL at 20:39

## 2024-05-09 RX ADMIN — OXYCODONE 10 MG: 5 TABLET ORAL at 20:39

## 2024-05-09 RX ADMIN — METHOCARBAMOL 1000 MG: 500 TABLET ORAL at 07:47

## 2024-05-09 RX ADMIN — OXYCODONE 10 MG: 5 TABLET ORAL at 06:53

## 2024-05-09 RX ADMIN — CARVEDILOL 6.25 MG: 6.25 TABLET, FILM COATED ORAL at 07:47

## 2024-05-09 RX ADMIN — Medication 5 MG: at 20:39

## 2024-05-09 RX ADMIN — GABAPENTIN 800 MG: 400 CAPSULE ORAL at 20:39

## 2024-05-09 RX ADMIN — ACETAMINOPHEN 1000 MG: 500 TABLET ORAL at 20:39

## 2024-05-09 RX ADMIN — ACETAMINOPHEN 1000 MG: 500 TABLET ORAL at 14:01

## 2024-05-09 ASSESSMENT — PAIN DESCRIPTION - ORIENTATION: ORIENTATION: MID

## 2024-05-09 ASSESSMENT — PAIN SCALES - GENERAL
PAINLEVEL_OUTOF10: 6
PAINLEVEL_OUTOF10: 7

## 2024-05-09 ASSESSMENT — PAIN DESCRIPTION - LOCATION
LOCATION: BACK

## 2024-05-09 ASSESSMENT — PAIN DESCRIPTION - DESCRIPTORS: DESCRIPTORS: DISCOMFORT

## 2024-05-09 NOTE — PROGRESS NOTES
Occupational Therapy  Facility/Department: [facility]  Rehabilitation Occupational Therapy Daily Treatment Note    Date: 24  Patient Name: Jd Huertas       Room: 0162/0162-01  MRN: [id number]  Account: [id number]   : 1965  (59 y.o.) Gender: male                    Past Medical History:  has no past medical history on file.  Past Surgical History:   has no past surgical history on file.    Restrictions  Restrictions/Precautions: Fall Risk, Up as Tolerated, Surgical Protocols  Other position/activity restrictions: no BLT, no brace  Required Braces or Orthoses?: No    Subjective  Subjective: Pt seated in wc and agreeable to OT tx. Reports bout of emesis prior to arrival, RN notified. Pt reports feeling better. /80, HR 74, SPO2 95%. 6/10 pain in back  Restrictions/Precautions: Fall Risk;Up as Tolerated;Surgical Protocols             Objective     Cognition  Overall Cognitive Status: Exceptions  Arousal/Alertness: Appropriate responses to stimuli  Following Commands: Follows all commands without difficulty  Attention Span: Appears intact  Memory: Appears intact  Safety Judgement: Decreased awareness of need for safety;Decreased awareness of need for assistance  Problem Solving: Decreased awareness of errors  Insights: Decreased awareness of deficits  Initiation: Does not require cues  Sequencing: Does not require cues  Orientation  Overall Orientation Status: Within Functional Limits  Orientation Level: Oriented X4         ADL  Upper Extremity Dressing  Assistance Level: Supervision  Skilled Clinical Factors: don/doff pullover shirt  Putting On/Taking Off Footwear  Assistance Level: Supervision  Skilled Clinical Factors: semi-supine in figure 4 to don slippers    Instrumental ADL's  Instrumental ADLs: Yes  Light Housekeeping  Light Housekeeping Level: Walker  Light Housekeeping Level of Assistance: Supervision  Light Housekeeping: standing to fold clothing items. Pt stood ~3 mins before requiring  Devices  Safety Devices in place: Yes  Type of devices: Call light within reach;Left in bed;Bed alarm in place  Restraints  Initially in place: No    Second Session:  Pt semi-supine in bed and agreeable to OT tx. Pt reports 7/10 pain in back.  Pt amb > hospital courtyard with RW and SPV.   Pt completed 5 sit to stands from wc with initial vbc's for hand placement. Pt required SBA for safety.   Pt SBA for sit pivot from wc > recliner at end of session with all needs met. Continue OT POC.    Patient Education  Education  Education Given To: Patient  Education Provided: Role of Therapy;Plan of Care;Safety;ADL Function;Precautions;Mobility Training;Transfer Training  Education Provided Comments: safe transfer techniques, building endurance, ECON strategies when at home, bed mobility  Education Method: Verbal  Barriers to Learning: None  Education Outcome: Continued education needed;Verbalized understanding    Plan  Occupational Therapy Plan  Times Per Week: 5-7x/week  Current Treatment Recommendations: Strengthening;Balance training;Functional mobility training;Endurance training;Safety education & training;Pain management;Patient/Caregiver education & training;Self-Care / ADL;Home management training    Goals  Patient Goals   Patient goals : \"to get strong enough to go home\"  Short Term Goals  Time Frame for Short Term Goals: 7 days (5/12/24)- progressing 5/08  Short Term Goal 1: Pt will complete functional/toilet transfer SPV and LRAD  Short Term Goal 2: Pt will complete LB dressing min A and AE  Short Term Goal 3: Pt will complete LB bathing SBA  Short Term Goal 4: Pt will tolerate 5 min stand with SPV- progressing 5/08 stood ~6 mins with 1 seated rest break  Short Term Goal 5: Pt will complete simple IADL SPV- MET 5/08  Long Term Goals  Time Frame for Long Term Goals : 14 days (5/19/24)- progressing 5/08  Long Term Goal 1: Pt will complete functional/toilet transfer mod I  Long Term Goal 2: Pt will complete TB  dressing mod I  Long Term Goal 3: Pt will complete TB bathing mod I  Long Term Goal 4: Pt will tolerate 7 min stand mod I  Long Term Goal 5: Pt will complete simple IADL mod I          Therapy Time   Individual Concurrent Group Co-treatment   Time In 1030         Time Out 1130         Minutes 60         Timed Code Treatment Minutes: 60 Minutes     Second Session Therapy Time:    Individual Concurrent Group Co-treatment   Time In  1330         Time Out  1400         Minutes  30            Timed Code Treatment Minutes: 30     Total Treatment Minutes:  90       Radha Elizabeth, OT

## 2024-05-09 NOTE — PROGRESS NOTES
Physical Therapy  Facility/Department: [facility]  Rehabilitation Physical Therapy Treatment Note    NAME: Jd Huertas  : 1965 (59 y.o.)  MRN: [id number]  CODE STATUS: Full Code    Date of Service: 24       Restrictions:  Restrictions/Precautions: Fall Risk, Up as Tolerated, Surgical Protocols  Position Activity Restriction  Spinal Precautions: No Bending, No Lifting, No Twisting  Other position/activity restrictions: no BLT, no brace     SUBJECTIVE  Subjective  Subjective: pt found in bed, reports constipation  Pain: 7/10 pain back, RN provided pt with all medication available prior to therapy arrival          OBJECTIVE  Cognition  Overall Cognitive Status: WFL  Orientation  Overall Orientation Status: Within Functional Limits  Orientation Level: Oriented X4    Functional Mobility  Sit to Supine  Assistance Level: Stand by assist  Supine to Sit  Assistance Level: Supervision  Skilled Clinical Factors: via log rolling  Sit to Stand  Assistance Level: Stand by assist  Skilled Clinical Factors: from EOB adn wc to RW, cues for safe hand placement  Stand to Sit  Assistance Level: Stand by assist  Bed To/From Chair  Assistance Level: Stand by assist  Skilled Clinical Factors: with rW EOB to w/c      Environmental Mobility  Ambulation  Surface: Level surface  Device: Rolling walker  Distance: 250 ft+150 ft  Assistance Level: Stand by assist  Gait Deviations: Decreased weight shift bilateral;Decreased step length bilateral;Narrow base of support  Skilled Clinical Factors: relies heavily on UEs for support 2* pain. requires seated rest after  Wheelchair  Surface: Level surface  Device: Standard wheelchair  Assistance Level for Propulsion: Modified independent  Propulsion Method: Bilateral lower extremities  Propulsion Distance: 150 ft      Neuromuscular Education  NDT Treatment: Standing  Neuromuscular Comments: pt completed 2 sets of 20 reps of BLE alternating forward/backwards step from 6\" step followed by  stronger\"  Short Term Goals  Time Frame for Short Term Goals: 7 days (5/12)  Short Term Goal 1: Pt will complete bed mobility SUP.  Short Term Goal 2: Pt will transfer with SUP.  Short Term Goal 3: Pt will ambulate with LRAD >150 ft SUP.  Short Term Goal 4: Pt will manage 12 stairs with SBA.  Long Term Goals  Time Frame for Long Term Goals : 14 days (5/19), updated 5/17 per team conference  Long Term Goal 1: Pt will complete bed mobility MOD I.  Long Term Goal 2: Pt will transfer with MOD I, using LRAD.  Long Term Goal 3: Pt will ambulate >150 ft using LRAD with MOD I.  Long Term Goal 4: Assess and make goal for [place] Standing balance as appropriate.    PLAN OF CARE/SAFETY  Physical Therapy Plan  General Plan: 5-7 times per week  Current Treatment Recommendations: Strengthening;Stair training;Pain management;Patient/Caregiver education & training;Neuromuscular re-education;Balance training;Functional mobility training;Transfer training;Gait training;Endurance training;Safety education & training;Home exercise program;Therapeutic activities;Co-Treatment  Safety Devices  Type of Devices: Bed alarm in place;Call light within reach;Left in bed;All fall risk precautions in place;Gait belt;Nurse notified;Patient at risk for falls    EDUCATION  Education  Education Given To: Patient  Education Provided: Role of Therapy;Plan of Care;Mobility Training;Precautions;Safety;Fall Prevention Strategies;Energy Conservation;Transfer Training  Education Provided Comments: role of PT, spinal precautions  Education Method: Verbal;Demonstration  Barriers to Learning: None  Education Outcome: Continued education needed        Therapy Time   Individual Concurrent Group Co-treatment   Time In 0800         Time Out 0900         Minutes 60           Timed Code Treatment Minutes: 60 Minutes     Second Session Therapy Time:   Individual Concurrent Group Co-treatment   Time In 1430         Time Out 1500         Minutes 30           Timed Code

## 2024-05-09 NOTE — PROGRESS NOTES
Jd Huertas  5/9/2024  [id number]    Chief Complaint: Closed fracture of thoracic spine without spinal cord lesion, sequela    Subjective:   Seen in his room this morning.  No new complaints overnight.  He feels like is making some progress in therapy but continues to have some serious pain in his back.  He is hoping that therapy improves back pain over the next few days.    ROS: denies f/c, n/v, cp, sob    Objective:  Patient Vitals for the past 24 hrs:   BP Temp Temp src Pulse Resp SpO2   05/09/24 1203 -- -- -- -- 16 --   05/09/24 1133 -- -- -- -- 16 --   05/09/24 0814 131/84 -- -- 84 -- 96 %   05/09/24 0745 134/82 98.2 °F (36.8 °C) Oral 82 16 96 %   05/08/24 2136 129/71 97.8 °F (36.6 °C) Oral 80 16 97 %   05/08/24 1716 121/66 -- -- 75 -- --   05/08/24 1531 126/77 -- -- 78 -- 98 %       Gen: No distress, pleasant.   HEENT: Normocephalic, atraumatic.   CV: Regular rate and rhythm.   Resp: No respiratory distress.   Abd: Soft, nondistended, tender to palpation   Ext: No edema.   Neuro: Alert, oriented, appropriately interactive.     Wt Readings from Last 3 Encounters:   05/06/24 82.6 kg (182 lb 1.6 oz)       Laboratory data:   Lab Results   Component Value Date    WBC 8.0 05/09/2024    HGB 12.0 (L) 05/09/2024    HCT 36.4 (L) 05/09/2024    MCV 84.3 05/09/2024     (H) 05/09/2024       Lab Results   Component Value Date/Time     05/09/2024 05:37 AM    K 4.1 05/09/2024 05:37 AM    CL 97 05/09/2024 05:37 AM    CO2 28 05/09/2024 05:37 AM    BUN 21 05/09/2024 05:37 AM    CREATININE 0.6 05/09/2024 05:37 AM    GLUCOSE 104 05/09/2024 05:37 AM    CALCIUM 8.8 05/09/2024 05:37 AM        Therapy progress:  Physical therapy:  Bed Mobility:  Overall Assistance Level:  (Pt rolling each direction to reposition for pain management, MOD I)  Sit>supine:  Assistance Level: Stand by assist  Supine>sit:  Assistance Level: Supervision  Skilled Clinical Factors: via log rolling  Transfers:     Sit>stand:  Assistance Level:

## 2024-05-10 PROCEDURE — 97535 SELF CARE MNGMENT TRAINING: CPT

## 2024-05-10 PROCEDURE — 97110 THERAPEUTIC EXERCISES: CPT

## 2024-05-10 PROCEDURE — 1280000000 HC REHAB R&B

## 2024-05-10 PROCEDURE — 97116 GAIT TRAINING THERAPY: CPT

## 2024-05-10 PROCEDURE — 6360000002 HC RX W HCPCS: Performed by: STUDENT IN AN ORGANIZED HEALTH CARE EDUCATION/TRAINING PROGRAM

## 2024-05-10 PROCEDURE — 97530 THERAPEUTIC ACTIVITIES: CPT

## 2024-05-10 PROCEDURE — 6370000000 HC RX 637 (ALT 250 FOR IP): Performed by: STUDENT IN AN ORGANIZED HEALTH CARE EDUCATION/TRAINING PROGRAM

## 2024-05-10 RX ORDER — ENOXAPARIN SODIUM 100 MG/ML
40 INJECTION SUBCUTANEOUS DAILY
Status: DISPENSED | OUTPATIENT
Start: 2024-05-11

## 2024-05-10 RX ADMIN — OXYCODONE 10 MG: 5 TABLET ORAL at 12:47

## 2024-05-10 RX ADMIN — OXYCODONE 10 MG: 5 TABLET ORAL at 17:32

## 2024-05-10 RX ADMIN — METHOCARBAMOL 1000 MG: 500 TABLET ORAL at 21:09

## 2024-05-10 RX ADMIN — Medication 5 MG: at 21:09

## 2024-05-10 RX ADMIN — GABAPENTIN 800 MG: 400 CAPSULE ORAL at 12:46

## 2024-05-10 RX ADMIN — METHOCARBAMOL 1000 MG: 500 TABLET ORAL at 08:08

## 2024-05-10 RX ADMIN — CARVEDILOL 6.25 MG: 6.25 TABLET, FILM COATED ORAL at 17:32

## 2024-05-10 RX ADMIN — SENNOSIDES AND DOCUSATE SODIUM 1 TABLET: 50; 8.6 TABLET ORAL at 21:09

## 2024-05-10 RX ADMIN — SENNOSIDES AND DOCUSATE SODIUM 1 TABLET: 50; 8.6 TABLET ORAL at 09:04

## 2024-05-10 RX ADMIN — GABAPENTIN 800 MG: 400 CAPSULE ORAL at 08:07

## 2024-05-10 RX ADMIN — ACETAMINOPHEN 1000 MG: 500 TABLET ORAL at 03:52

## 2024-05-10 RX ADMIN — ACETAMINOPHEN 1000 MG: 500 TABLET ORAL at 12:47

## 2024-05-10 RX ADMIN — METHOCARBAMOL 1000 MG: 500 TABLET ORAL at 12:47

## 2024-05-10 RX ADMIN — POLYETHYLENE GLYCOL 3350 17 G: 17 POWDER, FOR SOLUTION ORAL at 21:09

## 2024-05-10 RX ADMIN — POLYETHYLENE GLYCOL 3350 17 G: 17 POWDER, FOR SOLUTION ORAL at 09:04

## 2024-05-10 RX ADMIN — ENOXAPARIN SODIUM 40 MG: 100 INJECTION SUBCUTANEOUS at 09:04

## 2024-05-10 RX ADMIN — GABAPENTIN 800 MG: 400 CAPSULE ORAL at 21:09

## 2024-05-10 RX ADMIN — OXYCODONE 10 MG: 5 TABLET ORAL at 03:50

## 2024-05-10 RX ADMIN — CARVEDILOL 6.25 MG: 6.25 TABLET, FILM COATED ORAL at 09:04

## 2024-05-10 RX ADMIN — OXYCODONE 10 MG: 5 TABLET ORAL at 08:07

## 2024-05-10 RX ADMIN — ACETAMINOPHEN 1000 MG: 500 TABLET ORAL at 21:09

## 2024-05-10 ASSESSMENT — PAIN SCALES - GENERAL
PAINLEVEL_OUTOF10: 9
PAINLEVEL_OUTOF10: 8
PAINLEVEL_OUTOF10: 7
PAINLEVEL_OUTOF10: 7
PAINLEVEL_OUTOF10: 8
PAINLEVEL_OUTOF10: 9

## 2024-05-10 ASSESSMENT — PAIN DESCRIPTION - DESCRIPTORS
DESCRIPTORS: ACHING

## 2024-05-10 ASSESSMENT — PAIN DESCRIPTION - ORIENTATION
ORIENTATION: MID
ORIENTATION: LEFT;RIGHT
ORIENTATION: RIGHT;LEFT
ORIENTATION: LEFT;RIGHT
ORIENTATION: MID

## 2024-05-10 ASSESSMENT — PAIN DESCRIPTION - LOCATION
LOCATION: LEG;HIP
LOCATION: BACK;LEG
LOCATION: HIP;LEG
LOCATION: BACK

## 2024-05-10 NOTE — PROGRESS NOTES
Jd Huertas  5/10/2024  [id number]    Chief Complaint: Closed fracture of thoracic spine without spinal cord lesion, sequela    Subjective:   No new complaints overnight.  He feels like he is making some progress in therapy.  His pain is improving slowly but he continues to have lower back pain.  He continues to be motivated in therapy.    ROS: denies f/c, n/v, cp, sob    Objective:  Patient Vitals for the past 24 hrs:   BP Temp Temp src Pulse Resp SpO2   05/10/24 1317 -- -- -- -- 18 --   05/10/24 0900 127/84 97.7 °F (36.5 °C) Oral 81 18 96 %   05/10/24 0840 (!) 154/82 -- -- -- -- --   05/10/24 0837 -- -- -- -- 18 --   05/10/24 0836 (!) 161/79 -- -- -- -- 99 %   05/09/24 2037 130/78 98 °F (36.7 °C) Oral 93 16 95 %   05/09/24 1632 -- -- -- 86 -- --   05/09/24 1630 127/72 -- -- -- -- --       Gen: No distress, pleasant.   HEENT: Normocephalic, atraumatic.   CV: Regular rate and rhythm.   Resp: No respiratory distress.   Abd: Soft, nondistended, tender to palpation   Ext: No edema.   Neuro: Alert, oriented, appropriately interactive.     Wt Readings from Last 3 Encounters:   05/06/24 82.6 kg (182 lb 1.6 oz)       Laboratory data:   Lab Results   Component Value Date    WBC 8.0 05/09/2024    HGB 12.0 (L) 05/09/2024    HCT 36.4 (L) 05/09/2024    MCV 84.3 05/09/2024     (H) 05/09/2024       Lab Results   Component Value Date/Time     05/09/2024 05:37 AM    K 4.1 05/09/2024 05:37 AM    CL 97 05/09/2024 05:37 AM    CO2 28 05/09/2024 05:37 AM    BUN 21 05/09/2024 05:37 AM    CREATININE 0.6 05/09/2024 05:37 AM    GLUCOSE 104 05/09/2024 05:37 AM    CALCIUM 8.8 05/09/2024 05:37 AM        Therapy progress:  Physical therapy:  Bed Mobility:  Overall Assistance Level: Supervision  Sit>supine:  Assistance Level: Supervision  Supine>sit:  Assistance Level: Supervision  Skilled Clinical Factors: via log rolling  Transfers:     Sit>stand:  Assistance Level: Supervision  Skilled Clinical Factors:

## 2024-05-10 NOTE — PROGRESS NOTES
Occupational Therapy  Facility/Department: [facility]  Rehabilitation Occupational Therapy Daily Treatment Note    Date: 5/10/24  Patient Name: Jd Huertas       Room: 0162/0162-01  MRN: [id number]  Account: [id number]   : 1965  (59 y.o.) Gender: male            Pt was bathed during OT session this date. Pt was Showered with soap/water with Supervision.         Past Medical History:  has no past medical history on file.  Past Surgical History:   has no past surgical history on file.    Restrictions  Restrictions/Precautions: Fall Risk, Up as Tolerated, Surgical Protocols  Other position/activity restrictions: no BLT, no brace  Required Braces or Orthoses?: No    Subjective  Subjective: Pt semi-supine in bed and agreeable to OT tx. Requests pain medication, RN notified and provided pain medication, 8/10. Vitals WFL  Restrictions/Precautions: Fall Risk;Up as Tolerated;Surgical Protocols             Objective     Cognition  Overall Cognitive Status: Exceptions  Arousal/Alertness: Appropriate responses to stimuli  Following Commands: Follows all commands without difficulty  Attention Span: Appears intact  Memory: Appears intact  Safety Judgement: Decreased awareness of need for safety;Decreased awareness of need for assistance  Problem Solving: Decreased awareness of errors  Insights: Decreased awareness of deficits  Initiation: Does not require cues  Sequencing: Does not require cues  Orientation  Overall Orientation Status: Within Functional Limits  Orientation Level: Oriented X4         ADL  Upper Extremity Bathing  Assistance Level: Supervision  Skilled Clinical Factors: seated on TTB  Lower Extremity Bathing  Assistance Level: Supervision  Skilled Clinical Factors: seated on TTB, figure 4 to wash/dry BLE  Upper Extremity Dressing  Assistance Level: Modified independent  Skilled Clinical Factors: don/doff pullover shirt  Putting On/Taking Off Footwear  Assistance Level: Supervision  Skilled Clinical

## 2024-05-10 NOTE — CARE COORDINATION
ARU Case Management/Follow up:    Chart reviewed. IP day #: 4  Consultants Following: n/a  Discharge date: 5/17/24   Therapy recommendations: Outpatient Physical Therapy   DME needed: [facility] ORR   Services set up: Outpatient [facility] for DME>will need C9 approval    59 yr old male. Dx:Closed fracture of thoracic spine without spinal cord lesion, sequela. Independent PLOF. Lives with son and daughter in a 2 level home with 1 step to enter. C9 filled out and signed by MD, faxed to [phone].    Anahy Sands RN

## 2024-05-10 NOTE — PROGRESS NOTES
Physical Therapy  Facility/Department: [facility]  Rehabilitation Physical Therapy Treatment Note    NAME: Jd Huertas  : 1965 (59 y.o.)  MRN: [id number]  CODE STATUS: Full Code    Date of Service: 5/10/24       Restrictions:  Restrictions/Precautions: Fall Risk, Up as Tolerated, Surgical Protocols  Position Activity Restriction  Spinal Precautions: No Bending, No Lifting, No Twisting  Other position/activity restrictions: no BLT, no brace     SUBJECTIVE  Subjective  Subjective: Pt in bed, reports \"I'm hurting so bad and didn't sleep much at all last night\"  Pain: 8-9/10 pain in back/legs, RN provided meds           OBJECTIVE     Functional Mobility  Bed Mobility  Overall Assistance Level: Supervision  Roll Left  Assistance Level: Modified independent  Roll Right  Assistance Level: Modified independent  Sit to Supine  Assistance Level: Supervision  Supine to Sit  Assistance Level: Supervision  Skilled Clinical Factors: via log rolling  Standing Balance  Comments: Pt dependent on UE support on RW for pain control/steadying. Unable to assess standing without UE support this session.  Sit to Stand  Assistance Level: Supervision  Skilled Clinical Factors: RW      Environmental Mobility  Ambulation  Surface: Level surface  Device: Rolling walker  Distance: >150 ft x 2 with seated rest between; pt moaning in severe pain, multiple standing rest breaks and cues to decrease UE support.  Assistance Level: Stand by assist  Gait Deviations: Decreased weight shift bilateral;Decreased step length bilateral;Narrow base of support  Skilled Clinical Factors: Over use of UE support on RW, tense posture 2* pain in back/LEs while walking. Pt is steady with slow kerwin.         PT Exercises  Exercise Treatment: In recliner, pt completes 20 reps each of ankle pumps, heel slides, TKE + hip flex, abd, glut squeezes for strengthening to improve mobility. Pt requires cues to decrease speed & improve technique.    Second Session  Pt  found in recliner, reporting 8-9/10 pain but no pain medication available. Pt politely declines gait training due to pain    Stand pivot recliner to wc with RW and supv    Wc mobility to and from gym with mod ind and BUE and BLEs    Stand pivot to SCIFIT With RW and supv  Pt completed 10 min on SCIFIT level 2 with BLEs only for increased CV endurance/LE strength     Sit to supine mod ind  Pt in bed at end of session with call light/needs within reach and alarm donned    ASSESSMENT/PROGRESS TOWARDS GOALS  Vital Signs  BP: (!) 154/82 (after 30 min PT/meds)  BP Location: Left upper arm  MAP (Calculated): 106  SpO2: 99 %  O2 Device: None (Room air)    Assessment  Assessment: Pt in bed, requires extra time and is limited by low back/LEs pain. RN assisted with meds (8-9/10). Pt with limited tolerance to walking using RW and LE strength exercises. Applied ice to reported pain area. Pt reports pain 6.5 by end of session.  Activity Tolerance: Patient limited by pain  Discharge Recommendations: Home with Home health PT;Outpatient PT  PT Equipment Recommendations  Equipment Needed: No  Other: pt reports having a w/c and RW, family acquired.    Goals  Patient Goals   Patient Goals : \"I just want this pain to go away\"  Short Term Goals  Time Frame for Short Term Goals: 7 days (5/12)  Short Term Goal 1: Pt will complete bed mobility SUP.  Short Term Goal 2: Pt will transfer with SUP.  Short Term Goal 3: Pt will ambulate with LRAD >150 ft SUP.  Short Term Goal 4: Pt will manage 12 stairs with SBA.  Long Term Goals  Time Frame for Long Term Goals : 14 days (5/19), updated 5/17 per team conference  Long Term Goal 1: Pt will complete bed mobility MOD I.  Long Term Goal 2: Pt will transfer with MOD I, using LRAD.  Long Term Goal 3: Pt will ambulate >150 ft using LRAD with MOD I.  Long Term Goal 4: Assess and make goal for [place] Standing balance as appropriate.    PLAN OF CARE/SAFETY  Physical Therapy Plan  General Plan: 5-7 times per

## 2024-05-11 PROCEDURE — 6370000000 HC RX 637 (ALT 250 FOR IP): Performed by: STUDENT IN AN ORGANIZED HEALTH CARE EDUCATION/TRAINING PROGRAM

## 2024-05-11 PROCEDURE — 97116 GAIT TRAINING THERAPY: CPT

## 2024-05-11 PROCEDURE — 97535 SELF CARE MNGMENT TRAINING: CPT

## 2024-05-11 PROCEDURE — 6360000002 HC RX W HCPCS: Performed by: STUDENT IN AN ORGANIZED HEALTH CARE EDUCATION/TRAINING PROGRAM

## 2024-05-11 PROCEDURE — 97110 THERAPEUTIC EXERCISES: CPT

## 2024-05-11 PROCEDURE — 97530 THERAPEUTIC ACTIVITIES: CPT

## 2024-05-11 PROCEDURE — 1280000000 HC REHAB R&B

## 2024-05-11 PROCEDURE — 97112 NEUROMUSCULAR REEDUCATION: CPT

## 2024-05-11 RX ADMIN — ACETAMINOPHEN 1000 MG: 500 TABLET ORAL at 13:21

## 2024-05-11 RX ADMIN — ACETAMINOPHEN 1000 MG: 500 TABLET ORAL at 21:14

## 2024-05-11 RX ADMIN — METHOCARBAMOL 1000 MG: 500 TABLET ORAL at 07:41

## 2024-05-11 RX ADMIN — ENOXAPARIN SODIUM 40 MG: 100 INJECTION SUBCUTANEOUS at 07:40

## 2024-05-11 RX ADMIN — METHOCARBAMOL 1000 MG: 500 TABLET ORAL at 13:21

## 2024-05-11 RX ADMIN — ACETAMINOPHEN 1000 MG: 500 TABLET ORAL at 03:39

## 2024-05-11 RX ADMIN — GABAPENTIN 800 MG: 400 CAPSULE ORAL at 21:14

## 2024-05-11 RX ADMIN — GABAPENTIN 800 MG: 400 CAPSULE ORAL at 07:40

## 2024-05-11 RX ADMIN — SENNOSIDES AND DOCUSATE SODIUM 1 TABLET: 50; 8.6 TABLET ORAL at 21:14

## 2024-05-11 RX ADMIN — SENNOSIDES AND DOCUSATE SODIUM 1 TABLET: 50; 8.6 TABLET ORAL at 07:41

## 2024-05-11 RX ADMIN — OXYCODONE 10 MG: 5 TABLET ORAL at 07:41

## 2024-05-11 RX ADMIN — GABAPENTIN 800 MG: 400 CAPSULE ORAL at 13:21

## 2024-05-11 RX ADMIN — POLYETHYLENE GLYCOL 3350 17 G: 17 POWDER, FOR SOLUTION ORAL at 21:15

## 2024-05-11 RX ADMIN — OXYCODONE 10 MG: 5 TABLET ORAL at 03:38

## 2024-05-11 RX ADMIN — POLYETHYLENE GLYCOL 3350 17 G: 17 POWDER, FOR SOLUTION ORAL at 07:42

## 2024-05-11 RX ADMIN — CARVEDILOL 6.25 MG: 6.25 TABLET, FILM COATED ORAL at 07:42

## 2024-05-11 RX ADMIN — METHOCARBAMOL 1000 MG: 500 TABLET ORAL at 21:14

## 2024-05-11 RX ADMIN — CARVEDILOL 6.25 MG: 6.25 TABLET, FILM COATED ORAL at 16:49

## 2024-05-11 ASSESSMENT — PAIN DESCRIPTION - DESCRIPTORS
DESCRIPTORS: ACHING;DISCOMFORT
DESCRIPTORS: ACHING;DISCOMFORT

## 2024-05-11 ASSESSMENT — PAIN DESCRIPTION - LOCATION
LOCATION: BACK
LOCATION: BACK;LEG;HIP
LOCATION: BACK
LOCATION: BACK;HIP

## 2024-05-11 ASSESSMENT — PAIN DESCRIPTION - ORIENTATION
ORIENTATION: LEFT
ORIENTATION: RIGHT;LEFT

## 2024-05-11 ASSESSMENT — PAIN SCALES - GENERAL
PAINLEVEL_OUTOF10: 8
PAINLEVEL_OUTOF10: 7
PAINLEVEL_OUTOF10: 8
PAINLEVEL_OUTOF10: 8

## 2024-05-11 NOTE — PROGRESS NOTES
Occupational Therapy  Facility/Department: [facility]  Rehabilitation Occupational Therapy Daily Treatment Note    Date: 24  Patient Name: Jd Huertas       Room: 0162/0162-01  MRN: [id number]  Account: [id number]   : 1965  (59 y.o.) Gender: male        Past Medical History:  has no past medical history on file.  Past Surgical History:   has no past surgical history on file.    Restrictions  Restrictions/Precautions: Fall Risk, Up as Tolerated, Surgical Protocols  Other position/activity restrictions: no BLT, no brace  Required Braces or Orthoses?: No    Subjective  Subjective: Pt received for OT session resting in bed, agreeable to session. Requesting pain medications. RN called. /84, HR 71, 97% O2 on RA.  Restrictions/Precautions: Fall Risk;Up as Tolerated;Surgical Protocols     Objective   Cognition  Overall Cognitive Status: Exceptions  Arousal/Alertness: Appropriate responses to stimuli  Following Commands: Follows all commands without difficulty  Attention Span: Appears intact  Memory: Appears intact  Safety Judgement: Decreased awareness of need for safety;Decreased awareness of need for assistance  Problem Solving: Decreased awareness of errors  Insights: Decreased awareness of deficits  Initiation: Does not require cues  Sequencing: Does not require cues  Orientation  Overall Orientation Status: Within Functional Limits  Orientation Level: Oriented X4;Oriented to place;Oriented to time;Oriented to situation;Oriented to person       ADL  Feeding  Assistance Level: Set-up  Skilled Clinical Factors: assisted with setting up/positioning breakfast tray while pt in bedside chair.     Functional Mobility  Device: Rolling walker  Activity:  (hallway)  Assistance Level: Supervision  Skilled Clinical Factors: amb in room/hallway with SPV and RW. reporting increased stiffness in L hip this date, able to continue safely.  Bed Mobility  Overall Assistance Level: Supervision  Additional Factors:  Treatment Minutes:  30 Minutes    Total Treatment Minutes:  90 Minutes      Total Treatment Minutes:         Jaquelin Gates, OT  GRAZYNA Chao/L (second and third session)

## 2024-05-11 NOTE — PROGRESS NOTES
Jd Huertas  5/11/2024  [id number]    Chief Complaint: Closed fracture of thoracic spine without spinal cord lesion, sequela    Subjective:   Seen in his room this morning.  No new complaints overnight.  He continues to have some back pain.  He is able to participate with therapy and feels like he is working hard every day.    ROS: denies f/c, n/v, cp, sob    Objective:  Patient Vitals for the past 24 hrs:   BP Temp Temp src Pulse Resp SpO2   05/11/24 0811 -- -- -- -- 18 --   05/11/24 0730 (!) 155/84 98.1 °F (36.7 °C) Oral 78 18 97 %   05/10/24 2100 136/80 98.2 °F (36.8 °C) Oral 79 18 97 %   05/10/24 1802 -- -- -- -- 18 --   05/10/24 1730 129/81 -- -- 83 -- --   05/10/24 1317 -- -- -- -- 18 --       Gen: No distress, pleasant.   HEENT: Normocephalic, atraumatic.   CV: Regular rate and rhythm.   Resp: No respiratory distress.   Abd: Soft, nondistended, tender to palpation   Ext: No edema.   Neuro: Alert, oriented, appropriately interactive.     Wt Readings from Last 3 Encounters:   05/06/24 82.6 kg (182 lb 1.6 oz)       Laboratory data:   Lab Results   Component Value Date    WBC 8.0 05/09/2024    HGB 12.0 (L) 05/09/2024    HCT 36.4 (L) 05/09/2024    MCV 84.3 05/09/2024     (H) 05/09/2024       Lab Results   Component Value Date/Time     05/09/2024 05:37 AM    K 4.1 05/09/2024 05:37 AM    CL 97 05/09/2024 05:37 AM    CO2 28 05/09/2024 05:37 AM    BUN 21 05/09/2024 05:37 AM    CREATININE 0.6 05/09/2024 05:37 AM    GLUCOSE 104 05/09/2024 05:37 AM    CALCIUM 8.8 05/09/2024 05:37 AM        Therapy progress:  Physical therapy:  Bed Mobility:  Overall Assistance Level: Supervision  Sit>supine:  Assistance Level: Supervision  Supine>sit:  Assistance Level: Supervision  Skilled Clinical Factors: via log rolling  Transfers:     Sit>stand:  Assistance Level: Supervision  Skilled Clinical Factors: RW  Stand>sit:  Assistance Level: Stand by assist  Bed<>chair  Assistance Level: Stand by assist  Skilled Clinical  Factors: with rW EOB to w/c  Stand Pivot:     Lateral transfer:     Car transfer:     Ambulation:    Stairs:     Curb:     Wheelchair:      Occupational therapy:   Feeding  Assistance Level: Set-up  Skilled Clinical Factors: assisted with setting up/positioning breakfast tray while pt in bedside chair.  Grooming/Oral Hygiene     UE Bathing  Assistance Level: Supervision  Skilled Clinical Factors: seated on TTB  LE Bathing  Assistance Level: Supervision  Skilled Clinical Factors: seated on TTB, figure 4 to wash/dry BLE  UE Dressing  Assistance Level: Modified independent  Skilled Clinical Factors: don/doff pullover shirt  LE Dressing     Putting On/Taking Off Footwear  Assistance Level: Supervision  Skilled Clinical Factors: semi-supine in figure 4 to don/doff socks and slippers  Toileting  Assistance Level: Supervision  Skilled Clinical Factors: standing to void bladder    Speech therapy:    ADULT DIET; Regular    Body mass index is 25.4 kg/m².    Assessment and Plan:  Jd Huertas is a 59 year old male without significant past medical history who presented to [facility] on [facility] as a trauma after falling 15 feet onto a metal beam on his back while at work, found to have multiple thoracic and lumbar spinal fractures s/p T10-L2 PDFF. He was admitted to [facility] on 5/6/24 due to functional deficits below his baseline.      Comminuted T5 Vertebral Body Fracture  Retrolisthesis T5/6 with suspected longitudinal ligamentous injury  T8 Anterior Compression Fracture  T12 Burst Fx w/ Retropulsion and associated canal stenosis   Right Paravertebral Hematoma along T11-12  T12 TP Fracture  L1/2 TP Fractures  - s/p T10-L2 PDFF  - no brace needed  - spinal precautions  - incision open to air  - multimodal pain control, increased gabapentin   - PT, OT     R 10-12 rib fractures (mild displacement)   - not a candidate for SSRF  - IS    Abdominal and side pain  - suspect neuropathic referred pain  - XR abd with moderate stool burden, continue

## 2024-05-11 NOTE — PROGRESS NOTES
Physical Therapy  Facility/Department: [facility]  Rehabilitation Physical Therapy Treatment Note    NAME: Jd Huertas  : 1965 (59 y.o.)  MRN: [id number]  CODE STATUS: Full Code    Date of Service: 24       Restrictions:  Restrictions/Precautions: Fall Risk, Up as Tolerated, Surgical Protocols  Position Activity Restriction  Spinal Precautions: No Bending, No Lifting, No Twisting  Other position/activity restrictions: no BLT, no brace     SUBJECTIVE  Subjective  Subjective: Pt in bed, agreeable to therapy  Pain: 7/10 pain in L lower back and B posterior legs; received morning meds.                 OBJECTIVE  Cognition  Overall Cognitive Status: Exceptions  Arousal/Alertness: Appropriate responses to stimuli  Following Commands: Follows all commands without difficulty  Attention Span: Appears intact  Memory: Appears intact  Safety Judgement: Decreased awareness of need for safety;Decreased awareness of need for assistance  Problem Solving: Decreased awareness of errors  Insights: Decreased awareness of deficits  Initiation: Does not require cues  Sequencing: Does not require cues  Orientation  Overall Orientation Status: Within Functional Limits  Orientation Level: Oriented X4    Functional Mobility  Sit to Supine  Assistance Level: Stand by assist  Skilled Clinical Factors: cues to perform log roll technique; pt self-selecting long-sitting transfer technique  Supine to Sit  Assistance Level: Supervision  Skilled Clinical Factors: via log rolling  Scooting  Assistance Level: Supervision  Skilled Clinical Factors: sideways along EOB  Balance  Sitting Balance: Supervision  Standing Balance: Contact guard assistance (SBA to CGA at RW\)  Standing Balance  Comments: Pt dependent on UE support on RW for pain control/steadying. Unable to assess standing without UE support this session due to pain.  Transfers  Surface: To bed;To chair with arms;Wheelchair;From chair with arms;From bed  Additional Factors: Verbal  Training  Education Provided Comments: role of PT, spinal precautions with transfers and RW use  Education Method: Verbal;Demonstration  Barriers to Learning: None  Education Outcome: Continued education needed        Therapy Time   Individual Concurrent Group Co-treatment   Time In 1000         Time Out 1130         Minutes 90           Timed Code Treatment Minutes: 90 Minutes       Fabiana Noland, PT, 05/11/24 at 3:37 PM

## 2024-05-11 NOTE — PLAN OF CARE
Problem: Discharge Planning  Goal: Discharge to home or other facility with appropriate resources  Outcome: Progressing     Problem: Pain  Goal: Verbalizes/displays adequate comfort level or baseline comfort level  5/11/2024 0933 by Anahy Light RN  Outcome: Progressing  5/10/2024 2314 by Shira Jones, RN  Outcome: Progressing

## 2024-05-12 VITALS
TEMPERATURE: 98.5 F | RESPIRATION RATE: 18 BRPM | WEIGHT: 173.4 LBS | SYSTOLIC BLOOD PRESSURE: 152 MMHG | HEIGHT: 71 IN | HEART RATE: 78 BPM | OXYGEN SATURATION: 95 % | BODY MASS INDEX: 24.27 KG/M2 | DIASTOLIC BLOOD PRESSURE: 85 MMHG

## 2024-05-12 PROCEDURE — 6360000002 HC RX W HCPCS: Performed by: STUDENT IN AN ORGANIZED HEALTH CARE EDUCATION/TRAINING PROGRAM

## 2024-05-12 PROCEDURE — 1280000000 HC REHAB R&B

## 2024-05-12 PROCEDURE — 6370000000 HC RX 637 (ALT 250 FOR IP): Performed by: STUDENT IN AN ORGANIZED HEALTH CARE EDUCATION/TRAINING PROGRAM

## 2024-05-12 RX ADMIN — ACETAMINOPHEN 1000 MG: 500 TABLET ORAL at 20:52

## 2024-05-12 RX ADMIN — METHOCARBAMOL 1000 MG: 500 TABLET ORAL at 13:19

## 2024-05-12 RX ADMIN — POLYETHYLENE GLYCOL 3350 17 G: 17 POWDER, FOR SOLUTION ORAL at 20:52

## 2024-05-12 RX ADMIN — GABAPENTIN 800 MG: 400 CAPSULE ORAL at 08:39

## 2024-05-12 RX ADMIN — GABAPENTIN 800 MG: 400 CAPSULE ORAL at 20:51

## 2024-05-12 RX ADMIN — ENOXAPARIN SODIUM 40 MG: 100 INJECTION SUBCUTANEOUS at 08:39

## 2024-05-12 RX ADMIN — METHOCARBAMOL 1000 MG: 500 TABLET ORAL at 08:39

## 2024-05-12 RX ADMIN — METHOCARBAMOL 1000 MG: 500 TABLET ORAL at 20:52

## 2024-05-12 RX ADMIN — ACETAMINOPHEN 1000 MG: 500 TABLET ORAL at 04:53

## 2024-05-12 RX ADMIN — SENNOSIDES AND DOCUSATE SODIUM 1 TABLET: 50; 8.6 TABLET ORAL at 20:51

## 2024-05-12 RX ADMIN — CARVEDILOL 6.25 MG: 6.25 TABLET, FILM COATED ORAL at 08:39

## 2024-05-12 RX ADMIN — ACETAMINOPHEN 1000 MG: 500 TABLET ORAL at 13:19

## 2024-05-12 RX ADMIN — GABAPENTIN 800 MG: 400 CAPSULE ORAL at 13:19

## 2024-05-12 RX ADMIN — SENNOSIDES AND DOCUSATE SODIUM 1 TABLET: 50; 8.6 TABLET ORAL at 08:39

## 2024-05-12 RX ADMIN — CARVEDILOL 6.25 MG: 6.25 TABLET, FILM COATED ORAL at 16:54

## 2024-05-12 RX ADMIN — OXYCODONE 10 MG: 5 TABLET ORAL at 04:53

## 2024-05-12 RX ADMIN — POLYETHYLENE GLYCOL 3350 17 G: 17 POWDER, FOR SOLUTION ORAL at 08:39

## 2024-05-12 ASSESSMENT — PAIN - FUNCTIONAL ASSESSMENT: PAIN_FUNCTIONAL_ASSESSMENT: ACTIVITIES ARE NOT PREVENTED

## 2024-05-12 ASSESSMENT — PAIN DESCRIPTION - LOCATION
LOCATION: BACK;LEG
LOCATION: BACK;LEG
LOCATION: BACK;HIP
LOCATION: BACK;HIP

## 2024-05-12 ASSESSMENT — PAIN SCALES - GENERAL
PAINLEVEL_OUTOF10: 8
PAINLEVEL_OUTOF10: 7
PAINLEVEL_OUTOF10: 4
PAINLEVEL_OUTOF10: 7
PAINLEVEL_OUTOF10: 4

## 2024-05-12 ASSESSMENT — PAIN DESCRIPTION - ORIENTATION
ORIENTATION: RIGHT;LEFT;LOWER
ORIENTATION: LOWER;LEFT;RIGHT

## 2024-05-12 ASSESSMENT — PAIN DESCRIPTION - DESCRIPTORS
DESCRIPTORS: ACHING;DISCOMFORT
DESCRIPTORS: ACHING

## 2024-05-12 NOTE — PLAN OF CARE
Problem: Pain  Goal: Verbalizes/displays adequate comfort level or baseline comfort level  5/11/2024 2136 by Shira Jones, RN  Outcome: Progressing  5/11/2024 0933 by Anahy Light, RN  Outcome: Progressing  Flowsheets (Taken 5/11/2024 0730)  Verbalizes/displays adequate comfort level or baseline comfort level: Assess pain using appropriate pain scale

## 2024-05-12 NOTE — PLAN OF CARE
Problem: Safety - Adult  Goal: Free from fall injury  Outcome: Progressing     Problem: Pain  Goal: Verbalizes/displays adequate comfort level or baseline comfort level  5/12/2024 1040 by Anahy Light, RN  Outcome: Progressing  Flowsheets (Taken 5/12/2024 0830)  Verbalizes/displays adequate comfort level or baseline comfort level:   Encourage patient to monitor pain and request assistance   Assess pain using appropriate pain scale   Administer analgesics based on type and severity of pain and evaluate response  5/11/2024 2136 by Shira Jones, RN  Outcome: Progressing

## 2024-05-13 LAB
ANION GAP SERPL CALCULATED.3IONS-SCNC: 12 MMOL/L (ref 3–16)
BASOPHILS # BLD: 0.1 K/UL (ref 0–0.2)
BASOPHILS NFR BLD: 1.3 %
BUN SERPL-MCNC: 13 MG/DL (ref 7–20)
CALCIUM SERPL-MCNC: 8.8 MG/DL (ref 8.3–10.6)
CHLORIDE SERPL-SCNC: 98 MMOL/L (ref 99–110)
CO2 SERPL-SCNC: 27 MMOL/L (ref 21–32)
CREAT SERPL-MCNC: 0.6 MG/DL (ref 0.9–1.3)
DEPRECATED RDW RBC AUTO: 15.2 % (ref 12.4–15.4)
EOSINOPHIL # BLD: 0.3 K/UL (ref 0–0.6)
EOSINOPHIL NFR BLD: 4 %
GFR SERPLBLD CREATININE-BSD FMLA CKD-EPI: >90 ML/MIN/{1.73_M2}
GLUCOSE SERPL-MCNC: 104 MG/DL (ref 70–99)
HCT VFR BLD AUTO: 36.5 % (ref 40.5–52.5)
HGB BLD-MCNC: 12 G/DL (ref 13.5–17.5)
LYMPHOCYTES # BLD: 1.2 K/UL (ref 1–5.1)
LYMPHOCYTES NFR BLD: 17.2 %
MCH RBC QN AUTO: 27.9 PG (ref 26–34)
MCHC RBC AUTO-ENTMCNC: 32.9 G/DL (ref 31–36)
MCV RBC AUTO: 85 FL (ref 80–100)
MONOCYTES # BLD: 0.9 K/UL (ref 0–1.3)
MONOCYTES NFR BLD: 13.4 %
NEUTROPHILS # BLD: 4.4 K/UL (ref 1.7–7.7)
NEUTROPHILS NFR BLD: 64.1 %
PLATELET # BLD AUTO: 769 K/UL (ref 135–450)
PMV BLD AUTO: 7.3 FL (ref 5–10.5)
POTASSIUM SERPL-SCNC: 4 MMOL/L (ref 3.5–5.1)
RBC # BLD AUTO: 4.29 M/UL (ref 4.2–5.9)
SODIUM SERPL-SCNC: 137 MMOL/L (ref 136–145)
WBC # BLD AUTO: 6.8 K/UL (ref 4–11)

## 2024-05-13 PROCEDURE — 36415 COLL VENOUS BLD VENIPUNCTURE: CPT

## 2024-05-13 PROCEDURE — 80048 BASIC METABOLIC PNL TOTAL CA: CPT

## 2024-05-13 PROCEDURE — 97530 THERAPEUTIC ACTIVITIES: CPT

## 2024-05-13 PROCEDURE — 97535 SELF CARE MNGMENT TRAINING: CPT

## 2024-05-13 PROCEDURE — 6370000000 HC RX 637 (ALT 250 FOR IP): Performed by: STUDENT IN AN ORGANIZED HEALTH CARE EDUCATION/TRAINING PROGRAM

## 2024-05-13 PROCEDURE — 1280000000 HC REHAB R&B

## 2024-05-13 PROCEDURE — 97110 THERAPEUTIC EXERCISES: CPT

## 2024-05-13 PROCEDURE — 6360000002 HC RX W HCPCS: Performed by: STUDENT IN AN ORGANIZED HEALTH CARE EDUCATION/TRAINING PROGRAM

## 2024-05-13 PROCEDURE — 85025 COMPLETE CBC W/AUTO DIFF WBC: CPT

## 2024-05-13 RX ADMIN — GABAPENTIN 800 MG: 400 CAPSULE ORAL at 20:00

## 2024-05-13 RX ADMIN — ACETAMINOPHEN 1000 MG: 500 TABLET ORAL at 20:00

## 2024-05-13 RX ADMIN — POLYETHYLENE GLYCOL 3350 17 G: 17 POWDER, FOR SOLUTION ORAL at 20:01

## 2024-05-13 RX ADMIN — CARVEDILOL 6.25 MG: 6.25 TABLET, FILM COATED ORAL at 08:06

## 2024-05-13 RX ADMIN — OXYCODONE 10 MG: 5 TABLET ORAL at 13:48

## 2024-05-13 RX ADMIN — SENNOSIDES AND DOCUSATE SODIUM 1 TABLET: 50; 8.6 TABLET ORAL at 20:00

## 2024-05-13 RX ADMIN — Medication 5 MG: at 20:00

## 2024-05-13 RX ADMIN — METHOCARBAMOL 1000 MG: 500 TABLET ORAL at 13:48

## 2024-05-13 RX ADMIN — ACETAMINOPHEN 1000 MG: 500 TABLET ORAL at 13:48

## 2024-05-13 RX ADMIN — GABAPENTIN 800 MG: 400 CAPSULE ORAL at 13:48

## 2024-05-13 RX ADMIN — METHOCARBAMOL 1000 MG: 500 TABLET ORAL at 20:00

## 2024-05-13 RX ADMIN — OXYCODONE 10 MG: 5 TABLET ORAL at 20:00

## 2024-05-13 RX ADMIN — ACETAMINOPHEN 1000 MG: 500 TABLET ORAL at 05:19

## 2024-05-13 RX ADMIN — METHOCARBAMOL 1000 MG: 500 TABLET ORAL at 07:22

## 2024-05-13 RX ADMIN — GABAPENTIN 800 MG: 400 CAPSULE ORAL at 07:23

## 2024-05-13 RX ADMIN — SENNOSIDES AND DOCUSATE SODIUM 1 TABLET: 50; 8.6 TABLET ORAL at 07:23

## 2024-05-13 RX ADMIN — ENOXAPARIN SODIUM 40 MG: 100 INJECTION SUBCUTANEOUS at 08:06

## 2024-05-13 RX ADMIN — CARVEDILOL 6.25 MG: 6.25 TABLET, FILM COATED ORAL at 17:14

## 2024-05-13 RX ADMIN — POLYETHYLENE GLYCOL 3350 17 G: 17 POWDER, FOR SOLUTION ORAL at 08:06

## 2024-05-13 RX ADMIN — OXYCODONE 10 MG: 5 TABLET ORAL at 07:23

## 2024-05-13 ASSESSMENT — PAIN SCALES - GENERAL
PAINLEVEL_OUTOF10: 3
PAINLEVEL_OUTOF10: 5
PAINLEVEL_OUTOF10: 5
PAINLEVEL_OUTOF10: 7
PAINLEVEL_OUTOF10: 8
PAINLEVEL_OUTOF10: 8

## 2024-05-13 ASSESSMENT — PAIN DESCRIPTION - DESCRIPTORS
DESCRIPTORS: ACHING;SORE
DESCRIPTORS: SORE
DESCRIPTORS: ACHING

## 2024-05-13 ASSESSMENT — PAIN DESCRIPTION - ORIENTATION: ORIENTATION: LEFT;RIGHT

## 2024-05-13 ASSESSMENT — PAIN DESCRIPTION - LOCATION
LOCATION: HIP;LEG
LOCATION: BACK;HIP
LOCATION: BACK
LOCATION: BACK

## 2024-05-13 ASSESSMENT — PAIN - FUNCTIONAL ASSESSMENT: PAIN_FUNCTIONAL_ASSESSMENT: ACTIVITIES ARE NOT PREVENTED

## 2024-05-13 NOTE — PROGRESS NOTES
Comprehensive Nutrition Assessment    Type and Reason for Visit:  Reassess    Nutrition Recommendations/Plan:   Regular diet, monitor and encourage intake  Continue to monitor bowel habits and pertinent labs     Malnutrition Assessment:  Malnutrition Status:  At risk for malnutrition (Comment) (05/13/24 2893)    Context:  Acute Illness       Nutrition Assessment:    Follow up. Patient appears nutritionally stabe as he is consuming at least 50% of meals, no ONS ordered. Patient working with therapy to gain strength for return home. Noted 9# wt loss since ARU admit however pt is also -4L. Bowels moving and labs reviewed. No nutritional concerns at this time, RD will continue to follow.    Nutrition Related Findings:    +BM 5/12- regimen orderd, labs reviewed-- , no edema Wound Type: None       Current Nutrition Intake & Therapies:    Average Meal Intake: 26-50%, 51-75%, %  Average Supplements Intake: None Ordered  ADULT DIET; Regular    Anthropometric Measures:  Height: 180.3 cm (5' 11\")  Ideal Body Weight (IBW): 172 lbs (78 kg)    Current Body Weight: 78.7 kg (173 lb 8 oz),   IBW. Weight Source: Standing Scale  Current BMI (kg/m2): 24.2    Estimated Daily Nutrient Needs:  Energy Requirements Based On: Kcal/kg  Weight Used for Energy Requirements: Current  Energy (kcal/day): 2116-3242 (22-25 kcal/kg)  Weight Used for Protein Requirements: Current  Protein (g/day):  (1-1.3 gm/kg)     Fluid (ml/day): 5160-9125 ml    Nutrition Diagnosis:   Increased nutrient needs related to acute injury/trauma as evidenced by  (s/p fall and spinal surgery)    Nutrition Interventions:   Food and/or Nutrient Delivery: Continue Current Diet  Nutrition Education/Counseling: Education not indicated  Coordination of Nutrition Care: Continue to monitor while inpatient  Goals:  Goals: PO intake 75% or greater, prior to discharge     Nutrition Monitoring and Evaluation:   Food/Nutrient Intake Outcomes: Food and Nutrient  Intake  Physical Signs/Symptoms Outcomes: Biochemical Data    Brittni Cooper RD  Contact: Office: [phone]; [place]: [phone]

## 2024-05-13 NOTE — PROGRESS NOTES
Physical Therapy  Facility/Department: [facility]  Rehabilitation Physical Therapy Treatment Note    NAME: Jd Huertas  : 1965 (59 y.o.)  MRN: [id number]  CODE STATUS: Full Code    Date of Service: 24       Restrictions:  Restrictions/Precautions: Fall Risk, Up as Tolerated, Surgical Protocols  Position Activity Restriction  Spinal Precautions: No Bending, No Lifting, No Twisting  Other position/activity restrictions: no BLT, no brace     SUBJECTIVE  Subjective  Subjective: Pt in bed, complains of pain but agreeable to therapy.  Pain: 7/10 L flank pain, reports recently having pain meds.           OBJECTIVE  Cognition  Overall Cognitive Status: Exceptions  Arousal/Alertness: Appropriate responses to stimuli  Following Commands: Follows all commands without difficulty  Attention Span: Appears intact  Memory: Appears intact  Safety Judgement: Decreased awareness of need for safety;Decreased awareness of need for assistance  Problem Solving: Decreased awareness of errors  Insights: Decreased awareness of deficits  Initiation: Does not require cues  Sequencing: Does not require cues  Orientation  Overall Orientation Status: Within Functional Limits  Orientation Level: Oriented X4    Functional Mobility  Supine to Sit  Assistance Level: Modified independent  Skilled Clinical Factors: via log rolling  Balance  Standing Balance: Stand by assistance  Standing Balance  Comments: Pt unable to tolerate standing without UE support today 2* pain, dependent on UE to relieve pressure through LEs. Toileting while standing with supervision using RW.  Sit to Stand  Assistance Level: Supervision  Skilled Clinical Factors: RW  Stand to Sit  Assistance Level: Supervision      Environmental Mobility  Ambulation  Surface: Level surface  Device: Rolling walker  Distance: 300 ft  Assistance Level: Stand by assist;Supervision  Gait Deviations: Decreased weight shift bilateral;Decreased step length bilateral;Narrow base of  Term Goal 2: Pt will transfer with MOD I, using LRAD. GOAL NOT MET  Long Term Goal 3: Pt will ambulate >150 ft using LRAD with MOD I. GOAL NOT MET  Long Term Goal 4: Assess and make goal for [place] Standing balance as appropriate. GOAL NOT MET, unable to assess 2* pain without UE support    PLAN OF CARE/SAFETY  Physical Therapy Plan  General Plan: 5-7 times per week  Current Treatment Recommendations: Strengthening;Stair training;Pain management;Patient/Caregiver education & training;Neuromuscular re-education;Balance training;Functional mobility training;Transfer training;Gait training;Endurance training;Safety education & training;Home exercise program;Therapeutic activities;Co-Treatment  Safety Devices  Type of Devices: Bed alarm in place;Call light within reach;Left in bed;All fall risk precautions in place;Gait belt;Nurse notified;Patient at risk for falls    EDUCATION  Education  Education Given To: Patient  Education Provided: Role of Therapy;Plan of Care;Mobility Training;Precautions;Safety;Energy Conservation;Transfer Training  Education Provided Comments: role of PT, spinal precautions with transfers and RW use  Education Method: Verbal;Demonstration  Barriers to Learning: None  Education Outcome: Continued education needed        Therapy Time   Individual Concurrent Group Co-treatment   Time In 0900         Time Out 0930         Minutes 30           Timed Code Treatment Minutes: 30 Minutes     Second Session Therapy Time:   Individual Concurrent Group Co-treatment   Time In 1030         Time Out 1130         Minutes 60           Timed Code Treatment Minutes:  60    Total Treatment Minutes:   90    Maria C Finney PT, 05/13/24 at 9:49 AM

## 2024-05-13 NOTE — PROGRESS NOTES
Jd Huertas  5/13/2024  [id number]    Chief Complaint: Closed fracture of thoracic spine without spinal cord lesion, sequela    Subjective:   No acute events overnight. Today Jd is seen in his room with therapy. He reports continued pain in his hip. He reports that he is making good progress with therapies.     ROS: denies f/c, n/v, cp, sob    Objective:  Patient Vitals for the past 24 hrs:   BP Temp Temp src Pulse Resp SpO2 Weight   05/13/24 1115 -- -- -- -- -- -- 79.4 kg (175 lb)   05/13/24 0800 (!) 162/89 97.8 °F (36.6 °C) Oral 81 18 96 % --   05/13/24 0753 -- -- -- -- 18 -- --   05/12/24 2045 (!) 152/85 98.5 °F (36.9 °C) Oral 78 -- 95 % --   05/12/24 1645 (!) 146/77 -- -- 75 -- -- --     Gen: No distress, pleasant.   HEENT: Normocephalic, atraumatic.   CV: RRR  Resp: No respiratory distress.   Abd: Soft, nondistended  Ext: No edema.   Neuro: Alert, oriented, appropriately interactive.   Psych: appropriate mood and affect    Wt Readings from Last 3 Encounters:   05/13/24 79.4 kg (175 lb)       Laboratory data:   Lab Results   Component Value Date    WBC 6.8 05/13/2024    HGB 12.0 (L) 05/13/2024    HCT 36.5 (L) 05/13/2024    MCV 85.0 05/13/2024     (H) 05/13/2024       Lab Results   Component Value Date/Time     05/13/2024 05:38 AM    K 4.0 05/13/2024 05:38 AM    CL 98 05/13/2024 05:38 AM    CO2 27 05/13/2024 05:38 AM    BUN 13 05/13/2024 05:38 AM    CREATININE 0.6 05/13/2024 05:38 AM    GLUCOSE 104 05/13/2024 05:38 AM    CALCIUM 8.8 05/13/2024 05:38 AM        Therapy progress:  Physical therapy:  Bed Mobility:  Overall Assistance Level: Supervision  Sit>supine:  Assistance Level: Stand by assist  Skilled Clinical Factors: cues to perform log roll technique; pt self-selecting long-sitting transfer technique  Supine>sit:  Assistance Level: Modified independent  Skilled Clinical Factors: via log rolling  Transfers:  Surface: To bed, To chair with arms, Wheelchair, From chair with arms, From  bed  Additional Factors: Verbal cues, Increased time to complete (cues for hand placement and maintaining back precautions)  Device: Walker (RW)  Sit>stand:  Assistance Level: Supervision  Skilled Clinical Factors: RW  Stand>sit:  Assistance Level: Supervision  Bed<>chair  Assistance Level: Stand by assist  Skilled Clinical Factors: with RW EOB to w/c  Stand Pivot:     Lateral transfer:     Car transfer:     Ambulation:    Stairs:     Curb:     Wheelchair:      Occupational therapy:   Feeding  Assistance Level: Set-up  Skilled Clinical Factors: assisted with setting up/positioning breakfast tray while pt in bedside chair.  Grooming/Oral Hygiene     UE Bathing  Assistance Level: Supervision  Skilled Clinical Factors: seated on TTB  LE Bathing  Assistance Level: Supervision  Skilled Clinical Factors: seated on TTB, figure 4 to wash/dry BLE  UE Dressing  Assistance Level: Modified independent  Skilled Clinical Factors: don/doff pullover shirt  LE Dressing     Putting On/Taking Off Footwear  Equipment Provided: Reachers  Assistance Level: Supervision  Skilled Clinical Factors: seated EOB with reacher to don bilat slippers  Toileting  Assistance Level: Supervision  Skilled Clinical Factors: standing to void bladder    Speech therapy:    ADULT DIET; Regular    Body mass index is 24.41 kg/m².    Assessment and Plan:  Jd Huertas is a 59 year old male without significant past medical history who presented to [facility] on 4/22/24 as a trauma after falling 15 feet onto a metal beam on his back while at work, found to have multiple thoracic and lumbar spinal fractures s/p T10-L2 PDFF. He was admitted to [facility] on 5/6/24 due to functional deficits below his baseline.      Comminuted T5 Vertebral Body Fracture  Retrolisthesis T5/6 with suspected longitudinal ligamentous injury  T8 Anterior Compression Fracture  T12 Burst Fx w/ Retropulsion and associated canal stenosis   Right Paravertebral Hematoma along T11-12  T12 TP Fracture  L1/2

## 2024-05-13 NOTE — PROGRESS NOTES
Goal 2: Pt will complete TB dressing mod I  Long Term Goal 3: Pt will complete TB bathing mod I  Long Term Goal 4: Pt will tolerate 7 min stand mod I  Long Term Goal 5: Pt will complete simple IADL mod I             Therapy Time   Individual Concurrent Group Co-treatment   Time In 0730         Time Out 0800         Minutes 30         Timed Code Treatment Minutes: 30 Minutes     Second Session Therapy Time:    Individual Concurrent Group Co-treatment   Time In  1230         Time Out 1330         Minutes  60            Timed Code Treatment Minutes: 60     Total Treatment Minutes:  90       Radha Elizabeth OT

## 2024-05-13 NOTE — CARE COORDINATION
[facility] Case Management/Follow up:     Chart reviewed. IP day #: 7  Consultants Following: n/a  Discharge date: 5/17/24   Therapy recommendations: Outpatient Physical Therapy   DME needed: KIRBY, MARA   Services set up: Outpatient [facility] for DME>will need C9 approval     59 yr old male. Dx:Closed fracture of thoracic spine without spinal cord lesion, sequela. Independent PLOF. Lives with son and daughter in a 2 level home with 1 step to enter. C9 filled out and signed by MD, faxed to [phone] w/supporting documents and orders.     Anahy Sands RN

## 2024-05-13 NOTE — PLAN OF CARE
Problem: Pain  Goal: Verbalizes/displays adequate comfort level or baseline comfort level  5/12/2024 2126 by More Cabello RN  Outcome: Progressing  5/12/2024 1040 by Anahy Light RN  Outcome: Progressing  Flowsheets (Taken 5/12/2024 0830)  Verbalizes/displays adequate comfort level or baseline comfort level:   Encourage patient to monitor pain and request assistance   Assess pain using appropriate pain scale   Administer analgesics based on type and severity of pain and evaluate response     Problem: Safety - Adult  Goal: Free from fall injury  5/12/2024 1040 by Anahy Light, RN  Outcome: Progressing

## 2024-05-13 NOTE — PLAN OF CARE
Problem: Pain  Goal: Verbalizes/displays adequate comfort level or baseline comfort level  5/13/2024 0758 by Herbert Walker, RN  Outcome: Progressing  5/12/2024 2126 by More Cabello RN  Outcome: Progressing     Problem: Safety - Adult  Goal: Free from fall injury  Outcome: Progressing

## 2024-05-13 NOTE — PROGRESS NOTES
Jd Huertas  5/12/2024  [id number]    Chief Complaint: Closed fracture of thoracic spine without spinal cord lesion, sequela    Subjective:   Patient sleeping peacefully.    ROS: denies f/c, n/v, cp, sob    Objective:  Patient Vitals for the past 24 hrs:   BP Temp Temp src Pulse Resp SpO2 Weight   05/12/24 2045 (!) 152/85 98.5 °F (36.9 °C) Oral 78 -- 95 % --   05/12/24 1645 (!) 146/77 -- -- 75 -- -- --   05/12/24 0830 133/82 98 °F (36.7 °C) Oral 84 18 96 % --   05/12/24 0645 -- -- -- -- -- -- 78.7 kg (173 lb 6.4 oz)     Gen: No distress, pleasant.   HEENT: Normocephalic, atraumatic.   CV: Regular rate and rhythm.   Resp: No respiratory distress.   Abd: Soft, nondistended, tender to palpation   Ext: No edema.   Neuro: Alert, oriented, appropriately interactive.     Wt Readings from Last 3 Encounters:   05/12/24 78.7 kg (173 lb 6.4 oz)       Laboratory data:   Lab Results   Component Value Date    WBC 8.0 05/09/2024    HGB 12.0 (L) 05/09/2024    HCT 36.4 (L) 05/09/2024    MCV 84.3 05/09/2024     (H) 05/09/2024       Lab Results   Component Value Date/Time     05/09/2024 05:37 AM    K 4.1 05/09/2024 05:37 AM    CL 97 05/09/2024 05:37 AM    CO2 28 05/09/2024 05:37 AM    BUN 21 05/09/2024 05:37 AM    CREATININE 0.6 05/09/2024 05:37 AM    GLUCOSE 104 05/09/2024 05:37 AM    CALCIUM 8.8 05/09/2024 05:37 AM        Therapy progress:  Physical therapy:  Bed Mobility:  Overall Assistance Level: Supervision  Sit>supine:  Assistance Level: Stand by assist  Skilled Clinical Factors: cues to perform log roll technique; pt self-selecting long-sitting transfer technique  Supine>sit:  Assistance Level: Supervision  Skilled Clinical Factors: via log rolling  Transfers:  Surface: To bed, To chair with arms, Wheelchair, From chair with arms, From bed  Additional Factors: Verbal cues, Increased time to complete (cues for hand placement and maintaining back precautions)  Device: Walker (RW)  Sit>stand:  Assistance Level:  Stand by assist  Skilled Clinical Factors: RW  Stand>sit:  Assistance Level: Stand by assist  Bed<>chair  Assistance Level: Stand by assist  Skilled Clinical Factors: with RW EOB to w/c  Stand Pivot:     Lateral transfer:     Car transfer:     Ambulation:    Stairs:     Curb:     Wheelchair:      Occupational therapy:   Feeding  Assistance Level: Set-up  Skilled Clinical Factors: assisted with setting up/positioning breakfast tray while pt in bedside chair.  Grooming/Oral Hygiene     UE Bathing  Assistance Level: Supervision  Skilled Clinical Factors: seated on TTB  LE Bathing  Assistance Level: Supervision  Skilled Clinical Factors: seated on TTB, figure 4 to wash/dry BLE  UE Dressing  Assistance Level: Modified independent  Skilled Clinical Factors: don/doff pullover shirt  LE Dressing     Putting On/Taking Off Footwear  Assistance Level: Supervision  Skilled Clinical Factors: semi-supine in figure 4 to don/doff socks and slippers  Toileting  Assistance Level: Supervision  Skilled Clinical Factors: standing to void bladder    Speech therapy:    ADULT DIET; Regular    Body mass index is 24.18 kg/m².    Assessment and Plan:  Jd Huertas is a 59 year old male without significant past medical history who presented to [facility] on 4/22/24 as a trauma after falling 15 feet onto a metal beam on his back while at work, found to have multiple thoracic and lumbar spinal fractures s/p T10-L2 PDFF. He was admitted to [facility] on 5/6/24 due to functional deficits below his baseline.      Comminuted T5 Vertebral Body Fracture  Retrolisthesis T5/6 with suspected longitudinal ligamentous injury  T8 Anterior Compression Fracture  T12 Burst Fx w/ Retropulsion and associated canal stenosis   Right Paravertebral Hematoma along T11-12  T12 TP Fracture  L1/2 TP Fractures  - s/p T10-L2 PDFF  - no brace needed  - spinal precautions  - incision open to air  - multimodal pain control, increased gabapentin   - PT, OT     R 10-12 rib fractures (mild

## 2024-05-14 PROCEDURE — 97530 THERAPEUTIC ACTIVITIES: CPT

## 2024-05-14 PROCEDURE — 1280000000 HC REHAB R&B

## 2024-05-14 PROCEDURE — 6370000000 HC RX 637 (ALT 250 FOR IP): Performed by: STUDENT IN AN ORGANIZED HEALTH CARE EDUCATION/TRAINING PROGRAM

## 2024-05-14 PROCEDURE — 97116 GAIT TRAINING THERAPY: CPT

## 2024-05-14 PROCEDURE — 6360000002 HC RX W HCPCS: Performed by: STUDENT IN AN ORGANIZED HEALTH CARE EDUCATION/TRAINING PROGRAM

## 2024-05-14 PROCEDURE — 97110 THERAPEUTIC EXERCISES: CPT

## 2024-05-14 RX ORDER — GABAPENTIN 400 MG/1
800 CAPSULE ORAL 3 TIMES DAILY
Qty: 180 CAPSULE | Refills: 0 | Status: SHIPPED | OUTPATIENT
Start: 2024-05-17 | End: 2024-06-16

## 2024-05-14 RX ORDER — CARVEDILOL 6.25 MG/1
6.25 TABLET ORAL 2 TIMES DAILY WITH MEALS
Qty: 60 TABLET | Refills: 2 | Status: SHIPPED | OUTPATIENT
Start: 2024-05-17

## 2024-05-14 RX ORDER — POLYETHYLENE GLYCOL 3350 17 G/17G
17 POWDER, FOR SOLUTION ORAL 2 TIMES DAILY
Qty: 527 G | Refills: 1 | Status: SHIPPED | OUTPATIENT
Start: 2024-05-17

## 2024-05-14 RX ORDER — METHOCARBAMOL 1000 MG/1
1000 TABLET, COATED ORAL 3 TIMES DAILY
Qty: 90 TABLET | Refills: 0 | Status: SHIPPED | OUTPATIENT
Start: 2024-05-17 | End: 2024-06-16

## 2024-05-14 RX ORDER — SENNA AND DOCUSATE SODIUM 50; 8.6 MG/1; MG/1
1 TABLET, FILM COATED ORAL 2 TIMES DAILY
Qty: 60 TABLET | Refills: 2 | Status: SHIPPED | OUTPATIENT
Start: 2024-05-17

## 2024-05-14 RX ORDER — OXYCODONE HYDROCHLORIDE 5 MG/1
5 TABLET ORAL EVERY 6 HOURS PRN
Qty: 28 TABLET | Refills: 0 | Status: SHIPPED | OUTPATIENT
Start: 2024-05-17 | End: 2024-05-17

## 2024-05-14 RX ADMIN — ACETAMINOPHEN 1000 MG: 500 TABLET ORAL at 13:28

## 2024-05-14 RX ADMIN — GABAPENTIN 800 MG: 400 CAPSULE ORAL at 09:33

## 2024-05-14 RX ADMIN — OXYCODONE 10 MG: 5 TABLET ORAL at 20:12

## 2024-05-14 RX ADMIN — GABAPENTIN 800 MG: 400 CAPSULE ORAL at 20:12

## 2024-05-14 RX ADMIN — GABAPENTIN 800 MG: 400 CAPSULE ORAL at 13:28

## 2024-05-14 RX ADMIN — Medication 5 MG: at 20:12

## 2024-05-14 RX ADMIN — ENOXAPARIN SODIUM 40 MG: 100 INJECTION SUBCUTANEOUS at 09:33

## 2024-05-14 RX ADMIN — METHOCARBAMOL 1000 MG: 500 TABLET ORAL at 13:28

## 2024-05-14 RX ADMIN — METHOCARBAMOL 1000 MG: 500 TABLET ORAL at 20:11

## 2024-05-14 RX ADMIN — CARVEDILOL 6.25 MG: 6.25 TABLET, FILM COATED ORAL at 09:33

## 2024-05-14 RX ADMIN — OXYCODONE 10 MG: 5 TABLET ORAL at 09:42

## 2024-05-14 RX ADMIN — SENNOSIDES AND DOCUSATE SODIUM 1 TABLET: 50; 8.6 TABLET ORAL at 09:33

## 2024-05-14 RX ADMIN — METHOCARBAMOL 1000 MG: 500 TABLET ORAL at 09:33

## 2024-05-14 RX ADMIN — POLYETHYLENE GLYCOL 3350 17 G: 17 POWDER, FOR SOLUTION ORAL at 09:33

## 2024-05-14 RX ADMIN — CARVEDILOL 6.25 MG: 6.25 TABLET, FILM COATED ORAL at 18:06

## 2024-05-14 RX ADMIN — ACETAMINOPHEN 1000 MG: 500 TABLET ORAL at 20:12

## 2024-05-14 RX ADMIN — ACETAMINOPHEN 1000 MG: 500 TABLET ORAL at 04:11

## 2024-05-14 RX ADMIN — SENNOSIDES AND DOCUSATE SODIUM 1 TABLET: 50; 8.6 TABLET ORAL at 20:11

## 2024-05-14 RX ADMIN — OXYCODONE 10 MG: 5 TABLET ORAL at 04:10

## 2024-05-14 ASSESSMENT — PAIN SCALES - GENERAL
PAINLEVEL_OUTOF10: 7
PAINLEVEL_OUTOF10: 8
PAINLEVEL_OUTOF10: 7
PAINLEVEL_OUTOF10: 6

## 2024-05-14 ASSESSMENT — PAIN DESCRIPTION - ONSET: ONSET: ON-GOING

## 2024-05-14 ASSESSMENT — PAIN DESCRIPTION - DIRECTION: RADIATING_TOWARDS: LEGS

## 2024-05-14 ASSESSMENT — PAIN DESCRIPTION - LOCATION
LOCATION: LEG
LOCATION: HIP
LOCATION: LEG

## 2024-05-14 ASSESSMENT — PAIN DESCRIPTION - ORIENTATION
ORIENTATION: RIGHT;LEFT

## 2024-05-14 ASSESSMENT — PAIN DESCRIPTION - FREQUENCY: FREQUENCY: CONTINUOUS

## 2024-05-14 ASSESSMENT — PAIN DESCRIPTION - DESCRIPTORS: DESCRIPTORS: ACHING

## 2024-05-14 ASSESSMENT — PAIN - FUNCTIONAL ASSESSMENT: PAIN_FUNCTIONAL_ASSESSMENT: PREVENTS OR INTERFERES SOME ACTIVE ACTIVITIES AND ADLS

## 2024-05-14 ASSESSMENT — PAIN DESCRIPTION - PAIN TYPE: TYPE: SURGICAL PAIN

## 2024-05-14 NOTE — PATIENT CARE CONFERENCE
[facility]  Inpatient Rehabilitation  Weekly Team Conference Note    Date: 5/15/2024  Patient Name: Jd Huertas        MRN: [id number]    : 1965  (59 y.o.)  Gender: male   Referring Practitioner: Heavenly Luna MD  Diagnosis: Per chart: \"Jd Huertas is a 59 year old male without significant past medical history who presented to [facility] on 24 as a trauma after falling 15 feet onto a metal beam on his back while at work. He was found to have T5 and T8 compression fractures, T12 burst fracture with retropulsion causing spinal canal stenosis. Neurosurgery was consulted and on 24 he underwent T10-L2 PDFF. Incidentally a AAA was found requiring no surgical intervention. He was started on a BB and recommended for follow up CT in 6 months. CT head also showed small chronic lacunar infarct within the right caudate. Post operative course was complicated by ileus requiring NG for decompression which has since been removed. He was admitted to [facility] on 24 due to functional deficits below his baseline. Today he reports continued back pain which he reports is currently a 6 on a scale from 1-10. He reports some numbness over his left flank, buttock, and upper thigh. He denies focal weakness. His last bowel movement was Saturday. He denies urinary complaints. He lives in a 2 level house with 1 MAX with his two teenaged children.\"      Interventions to be utilized toward barriers to discharge, per discipline:  NURSING  Nursing observed barriers to dc: Pain, Lower extremity weakness, upper extremity weakness.  Nursing interventions: Assist with ADLs, pain management  Family Education: No  Fall Risk:  Yes    Stay with me?: No    PHYSICAL THERAPY  Physical therapy observed barriers to dc:    Baseline: Independent, worked full time  Current level: SUP for transfers & ambulation, very limited by pain in L hip/flank   Barriers to DC: Pain limits time out of bed & mobility  Needs in order to

## 2024-05-14 NOTE — PROGRESS NOTES
Occupational Therapy  Facility/Department: [facility]  Rehabilitation Occupational Therapy Daily Treatment Note    Date: 24  Patient Name: Jd Huertas       Room: 0162/0162-01  MRN: [id number]  Account: [id number]   : 1965  (59 y.o.) Gender: male            Past Medical History:  has no past medical history on file.  Past Surgical History:   has no past surgical history on file.    Restrictions  Restrictions/Precautions: Fall Risk, Up as Tolerated, Surgical Protocols  Other position/activity restrictions: no BLT, no brace  Required Braces or Orthoses?: No    Subjective  Subjective: Pt supine in bed and agreeabe to OT tx. Reports 10 in back. /96, HR 85, SPO2 96%  Restrictions/Precautions: Fall Risk;Up as Tolerated;Surgical Protocols             Objective     Cognition  Overall Cognitive Status: Exceptions  Arousal/Alertness: Appropriate responses to stimuli  Following Commands: Follows all commands without difficulty  Attention Span: Appears intact  Memory: Appears intact  Problem Solving: Decreased awareness of errors  Insights: Decreased awareness of deficits  Initiation: Does not require cues  Sequencing: Does not require cues  Orientation  Overall Orientation Status: Within Functional Limits  Orientation Level: Oriented X4         ADL  Putting On/Taking Off Footwear  Equipment Provided: Reachers  Assistance Level: Modified independent  Skilled Clinical Factors: seated EOB with reacher to don/doff bilat slippers          Functional Mobility  Device: Rolling walker  Activity: To/From therapy gym  Assistance Level: Supervision  Skilled Clinical Factors: amb with RW <> therapy gym  Bed Mobility  Overall Assistance Level: Modified Independent  Additional Factors: Increased time to complete;Head of bed raised;With handrails  Roll Left  Assistance Level: Modified independent  Skilled Clinical Factors: to complete log roll  Sit to Supine  Assistance Level: Modified independent  Skilled Clinical

## 2024-05-14 NOTE — DISCHARGE INSTR - COC
Continuity of Care Form    Patient Name: Jd Stoddard   :  1965  MRN:  [id number]    Admit date:  2024  Discharge date:  2024    Code Status Order: Full Code   Advance Directives:     Admitting Physician:  Heavenly Luna MD  PCP: No primary care provider on file.    Discharging Nurse: Herbert TEE  Discharging Hospital Unit/Room#: 0162/0162-01  Discharging Unit Phone Number: [phone]    Emergency Contact:   Extended Emergency Contact Information  Primary Emergency Contact: BECCA STODDARD  Mobile Phone: [phone]  Relation: Child  Secondary Emergency Contact: YSABEL STODDARD  Mobile Phone: [phone]  Relation: Child    Past Surgical History:  No past surgical history on file.    Immunization History:     There is no immunization history on file for this patient.    Active Problems:  Patient Active Problem List   Diagnosis Code    Closed fracture of thoracic spine without spinal cord lesion, sequela S22.009S       Isolation/Infection:   Isolation            No Isolation          Patient Infection Status       None to display            Nurse Assessment:  Last Vital Signs: /73   Pulse 86   Temp 97.5 °F (36.4 °C) (Oral)   Resp 16   Ht 1.803 m (5' 11\")   Wt 79.4 kg (175 lb)   SpO2 97%   BMI 24.41 kg/m²     Last documented pain score (0-10 scale): Pain Level: 6  Last Weight:   Wt Readings from Last 1 Encounters:   24 79.4 kg (175 lb)     Mental Status:  oriented and alert    IV Access:  - None    Nursing Mobility/ADLs:  Walking   Independent  Transfer  Independent  Bathing  Independent  Dressing  Independent  Toileting  Independent  Feeding  Independent  Med Admin  Independent  Med Delivery   whole    Wound Care Documentation and Therapy:  Incision 24 Back Medial (Active)   Dressing Status Other (Comment) 24   Dressing/Treatment Open to air 24   Closure Surgical glue 24   Margins Approximated 24   Incision Assessment Dry  05/14/24 0930   Odor None 05/14/24 0930   Frannie-incision Assessment Intact 05/14/24 0930   Number of days: 7        Elimination:  Continence:   Bowel: Yes  Bladder: Yes  Urinary Catheter: None   Colostomy/Ileostomy/Ileal Conduit: No       Date of Last BM:     Intake/Output Summary (Last 24 hours) at 5/14/2024 1227  Last data filed at 5/14/2024 0412  Gross per 24 hour   Intake --   Output 800 ml   Net -800 ml     I/O last 3 completed shifts:  In: 600 [P.O.:600]  Out: 1401 [Urine:1401]    Safety Concerns:     History of Falls (last 30 days)    Impairments/Disabilities:      None    Nutrition Therapy:  Current Nutrition Therapy:   - Oral Diet:  General    Routes of Feeding: Oral  Liquids: Thin Liquids  Daily Fluid Restriction: no  Last Modified Barium Swallow with Video (Video Swallowing Test): not done    Treatments at the Time of Hospital Discharge:   Respiratory Treatments:   Oxygen Therapy:  is not on home oxygen therapy.  Ventilator:    - No ventilator support    Rehab Therapies: Physical Therapy and Occupational Therapy  Weight Bearing Status/Restrictions: No weight bearing restrictions  Other Medical Equipment (for information only, NOT a DME order):    Other Treatments:     Patient's personal belongings (please select all that are sent with patient):      RN SIGNATURE:  Electronically signed by patti fox RN on 5/17/24 at 10:39 AM EDT    CASE MANAGEMENT/SOCIAL WORK SECTION    Inpatient Status Date: ***    Readmission Risk Assessment Score:  Readmission Risk              Risk of Unplanned Readmission:  7           Discharging to Facility/ Agency   Name: [facility]  Address:  Phone:  Fax:    Dialysis Facility (if applicable)   Name:  Address:  Dialysis Schedule:  Phone:  Fax:    / signature: Electronically signed by Mya Haji RN on 5/17/24 at 10:07 AM EDT    PHYSICIAN SECTION    Prognosis: Good    Condition at Discharge: Stable    Rehab Potential (if transferring to Rehab):

## 2024-05-14 NOTE — PROGRESS NOTES
Physical Therapy  Facility/Department: [facility]  Rehabilitation Physical Therapy Treatment Note    NAME: Jd Huertas  : 1965 (59 y.o.)  MRN: [id number]  CODE STATUS: Full Code    Date of Service: 24       Restrictions:  Restrictions/Precautions: Fall Risk, Up as Tolerated, Surgical Protocols  Position Activity Restriction  Spinal Precautions: No Bending, No Lifting, No Twisting  Other position/activity restrictions: no BLT, no brace     SUBJECTIVE  Subjective  Subjective: Pt asleep in bed, agreeable to therapy but limited by pain.  Pain: 7.5/10 L lower back/hip maintained throughout session. Reports having meds prior to appt.           OBJECTIVE  Cognition  Overall Cognitive Status: Exceptions  Arousal/Alertness: Appropriate responses to stimuli  Following Commands: Follows all commands without difficulty  Attention Span: Appears intact  Memory: Appears intact  Safety Judgement: Decreased awareness of need for safety;Decreased awareness of need for assistance  Problem Solving: Decreased awareness of errors  Insights: Decreased awareness of deficits  Initiation: Does not require cues  Sequencing: Does not require cues  Orientation  Overall Orientation Status: Within Functional Limits  Orientation Level: Oriented X4    Functional Mobility  Bed Mobility  Overall Assistance Level: Modified Independent  Roll Left  Assistance Level: Modified independent  Roll Right  Assistance Level: Modified independent  Sit to Supine  Assistance Level: Modified independent  Supine to Sit  Assistance Level: Modified independent  Scooting  Assistance Level: Modified independent  Balance  Sitting Balance: Modified independent   Standing Balance: Supervision  Standing Balance  Comments: Pt unable to tolerate standing without UE support 2* pain.  Sit to Stand  Assistance Level: Supervision  Skilled Clinical Factors: RW  Stand to Sit  Assistance Level: Supervision      Environmental Mobility  Ambulation  Surface: Level  surface  Device: Rolling walker  Distance: 300 ft x 2 bouts, 1 shorter bout with seated rest breaks between. Pt w/ overuse of UEs, causing rounded elevated shoulders- pt reports to decrease pain.  Activity Comments: Unlevel ground, outside: pt ambulates 90 ft over tiles, thresholds, with SBA for safety. Pt w/ increased overuse of UEs on walker, reportedly to decrease pain.  Stairs  Stair Height: 6''  Device: Bilateral handrails  Number of Stairs: 12, seated rest break after 8, reciprocal pattern  Assistance Level: Stand by assist  Skilled Clinical Factors - Comments: pt moaning in pain throughout ascent.        Second session: Pt with elevated spirits this session, found in room recliner & motivated for PT. All transfers stand step with SUP using RW. Pt ambulates room <> gym with SUP, occasional standing rest breaks. LE strengthening: seated using B 4# ankle weights for LAQs, hip flexion x 10 reps each, standing hip flex/knee flex x 12 reps, BUE support on RW. Standing balance on wobble board x 2 min with weight shifting ant/post, side to side, and small squats. Gait without AD x 15 ft with increased pain. SCI Fit for endurance & LE strengthening 9 min + 2 min cool down. Recliner ex: B quad sets, glut sets, SLR x 20 reps each. Pt left in recliner with needs in reach.   ASSESSMENT/PROGRESS TOWARDS GOALS      Assessment  Assessment: Pt is limited by L hip/flank pain, reports 7.5/10 throughout session. Pt has limited tolerance to out of bed activity and unable to stand without UE support due to pain. Recommend PT to improve safe mobility prior to safe d/c.  Activity Tolerance: Patient limited by pain  Discharge Recommendations: Home with Home health PT;Outpatient PT  PT Equipment Recommendations  Equipment Needed: No  Other: pt reports having a w/c and RW, family acquired.    Goals  Patient Goals   Patient Goals : \"I want to be able to walk again\"  Short Term Goals  Time Frame for Short Term Goals: 7 days (5/12)  Short

## 2024-05-14 NOTE — PROGRESS NOTES
Jd Huertas  5/14/2024  [id number]    Chief Complaint: Closed fracture of thoracic spine without spinal cord lesion, sequela    Subjective:   No acute events overnight. Today Jd is seen in his room, resting in bed. He reports continued back and leg pain. He otherwise denies acute complaints at this time.     ROS: denies f/c, n/v, cp, sob    Objective:  Patient Vitals for the past 24 hrs:   BP Temp Temp src Pulse Resp SpO2   05/14/24 1012 -- -- -- -- 16 --   05/14/24 0930 127/73 97.5 °F (36.4 °C) Oral 86 16 97 %   05/13/24 1957 136/71 98.2 °F (36.8 °C) Oral 89 18 96 %   05/13/24 1700 116/71 -- -- 97 -- --   05/13/24 1418 -- -- -- -- 18 --     Gen: No distress, pleasant. Supine in bed  HEENT: Normocephalic, atraumatic.   CV: extremities well perfused  Resp: No respiratory distress, on room air   Abd: Soft, nondistended  Ext: No edema.   Neuro: Alert, oriented, appropriately interactive.   Psych: appropriate mood and affect    Wt Readings from Last 3 Encounters:   05/13/24 79.4 kg (175 lb)       Laboratory data:   Lab Results   Component Value Date    WBC 6.8 05/13/2024    HGB 12.0 (L) 05/13/2024    HCT 36.5 (L) 05/13/2024    MCV 85.0 05/13/2024     (H) 05/13/2024       Lab Results   Component Value Date/Time     05/13/2024 05:38 AM    K 4.0 05/13/2024 05:38 AM    CL 98 05/13/2024 05:38 AM    CO2 27 05/13/2024 05:38 AM    BUN 13 05/13/2024 05:38 AM    CREATININE 0.6 05/13/2024 05:38 AM    GLUCOSE 104 05/13/2024 05:38 AM    CALCIUM 8.8 05/13/2024 05:38 AM        Therapy progress:  Physical therapy:  Bed Mobility:  Overall Assistance Level: Modified Independent  Sit>supine:  Assistance Level: Modified independent  Skilled Clinical Factors: cues to perform log roll technique; pt self-selecting long-sitting transfer technique  Supine>sit:  Assistance Level: Modified independent  Skilled Clinical Factors: via log rolling  Transfers:  Surface: To bed, To chair with arms, Wheelchair, From chair with arms,

## 2024-05-15 LAB
ANION GAP SERPL CALCULATED.3IONS-SCNC: 9 MMOL/L (ref 3–16)
BASOPHILS # BLD: 0.1 K/UL (ref 0–0.2)
BASOPHILS NFR BLD: 1.4 %
BUN SERPL-MCNC: 15 MG/DL (ref 7–20)
CALCIUM SERPL-MCNC: 8.7 MG/DL (ref 8.3–10.6)
CHLORIDE SERPL-SCNC: 101 MMOL/L (ref 99–110)
CO2 SERPL-SCNC: 27 MMOL/L (ref 21–32)
CREAT SERPL-MCNC: <0.5 MG/DL (ref 0.9–1.3)
DEPRECATED RDW RBC AUTO: 14.8 % (ref 12.4–15.4)
EOSINOPHIL # BLD: 0.5 K/UL (ref 0–0.6)
EOSINOPHIL NFR BLD: 7.5 %
GFR SERPLBLD CREATININE-BSD FMLA CKD-EPI: >90 ML/MIN/{1.73_M2}
GLUCOSE SERPL-MCNC: 102 MG/DL (ref 70–99)
HCT VFR BLD AUTO: 34.2 % (ref 40.5–52.5)
HGB BLD-MCNC: 11.1 G/DL (ref 13.5–17.5)
LYMPHOCYTES # BLD: 1.3 K/UL (ref 1–5.1)
LYMPHOCYTES NFR BLD: 19.6 %
MCH RBC QN AUTO: 27.5 PG (ref 26–34)
MCHC RBC AUTO-ENTMCNC: 32.6 G/DL (ref 31–36)
MCV RBC AUTO: 84.5 FL (ref 80–100)
MONOCYTES # BLD: 0.8 K/UL (ref 0–1.3)
MONOCYTES NFR BLD: 11.6 %
NEUTROPHILS # BLD: 4 K/UL (ref 1.7–7.7)
NEUTROPHILS NFR BLD: 59.9 %
PLATELET # BLD AUTO: 637 K/UL (ref 135–450)
PMV BLD AUTO: 7.4 FL (ref 5–10.5)
POTASSIUM SERPL-SCNC: 4.4 MMOL/L (ref 3.5–5.1)
RBC # BLD AUTO: 4.05 M/UL (ref 4.2–5.9)
SODIUM SERPL-SCNC: 137 MMOL/L (ref 136–145)
WBC # BLD AUTO: 6.6 K/UL (ref 4–11)

## 2024-05-15 PROCEDURE — 97110 THERAPEUTIC EXERCISES: CPT

## 2024-05-15 PROCEDURE — 85025 COMPLETE CBC W/AUTO DIFF WBC: CPT

## 2024-05-15 PROCEDURE — 97112 NEUROMUSCULAR REEDUCATION: CPT

## 2024-05-15 PROCEDURE — 80048 BASIC METABOLIC PNL TOTAL CA: CPT

## 2024-05-15 PROCEDURE — 1280000000 HC REHAB R&B

## 2024-05-15 PROCEDURE — 6370000000 HC RX 637 (ALT 250 FOR IP): Performed by: STUDENT IN AN ORGANIZED HEALTH CARE EDUCATION/TRAINING PROGRAM

## 2024-05-15 PROCEDURE — 6360000002 HC RX W HCPCS: Performed by: STUDENT IN AN ORGANIZED HEALTH CARE EDUCATION/TRAINING PROGRAM

## 2024-05-15 PROCEDURE — 36415 COLL VENOUS BLD VENIPUNCTURE: CPT

## 2024-05-15 PROCEDURE — 97530 THERAPEUTIC ACTIVITIES: CPT

## 2024-05-15 RX ORDER — LIDOCAINE 4 G/G
1 PATCH TOPICAL DAILY
Status: DISCONTINUED | OUTPATIENT
Start: 2024-05-15 | End: 2024-05-17 | Stop reason: HOSPADM

## 2024-05-15 RX ADMIN — ACETAMINOPHEN 1000 MG: 500 TABLET ORAL at 15:02

## 2024-05-15 RX ADMIN — CARVEDILOL 6.25 MG: 6.25 TABLET, FILM COATED ORAL at 17:53

## 2024-05-15 RX ADMIN — METHOCARBAMOL 1000 MG: 500 TABLET ORAL at 08:01

## 2024-05-15 RX ADMIN — OXYCODONE 10 MG: 5 TABLET ORAL at 19:58

## 2024-05-15 RX ADMIN — METHOCARBAMOL 1000 MG: 500 TABLET ORAL at 19:59

## 2024-05-15 RX ADMIN — Medication 5 MG: at 19:59

## 2024-05-15 RX ADMIN — CARVEDILOL 6.25 MG: 6.25 TABLET, FILM COATED ORAL at 08:01

## 2024-05-15 RX ADMIN — METHOCARBAMOL 1000 MG: 500 TABLET ORAL at 15:02

## 2024-05-15 RX ADMIN — POLYETHYLENE GLYCOL 3350 17 G: 17 POWDER, FOR SOLUTION ORAL at 08:01

## 2024-05-15 RX ADMIN — SENNOSIDES AND DOCUSATE SODIUM 1 TABLET: 50; 8.6 TABLET ORAL at 19:59

## 2024-05-15 RX ADMIN — ENOXAPARIN SODIUM 40 MG: 100 INJECTION SUBCUTANEOUS at 08:01

## 2024-05-15 RX ADMIN — GABAPENTIN 800 MG: 400 CAPSULE ORAL at 19:59

## 2024-05-15 RX ADMIN — GABAPENTIN 800 MG: 400 CAPSULE ORAL at 15:02

## 2024-05-15 RX ADMIN — SENNOSIDES AND DOCUSATE SODIUM 1 TABLET: 50; 8.6 TABLET ORAL at 08:01

## 2024-05-15 RX ADMIN — POLYETHYLENE GLYCOL 3350 17 G: 17 POWDER, FOR SOLUTION ORAL at 19:58

## 2024-05-15 RX ADMIN — OXYCODONE 10 MG: 5 TABLET ORAL at 04:29

## 2024-05-15 RX ADMIN — OXYCODONE 10 MG: 5 TABLET ORAL at 08:39

## 2024-05-15 RX ADMIN — ACETAMINOPHEN 1000 MG: 500 TABLET ORAL at 04:28

## 2024-05-15 RX ADMIN — OXYCODONE 10 MG: 5 TABLET ORAL at 15:02

## 2024-05-15 RX ADMIN — ACETAMINOPHEN 1000 MG: 500 TABLET ORAL at 20:00

## 2024-05-15 RX ADMIN — GABAPENTIN 800 MG: 400 CAPSULE ORAL at 08:01

## 2024-05-15 ASSESSMENT — PAIN DESCRIPTION - ORIENTATION
ORIENTATION: LOWER;RIGHT;LEFT
ORIENTATION: RIGHT;LEFT

## 2024-05-15 ASSESSMENT — PAIN SCALES - GENERAL
PAINLEVEL_OUTOF10: 8
PAINLEVEL_OUTOF10: 6
PAINLEVEL_OUTOF10: 6
PAINLEVEL_OUTOF10: 7
PAINLEVEL_OUTOF10: 8
PAINLEVEL_OUTOF10: 8

## 2024-05-15 ASSESSMENT — PAIN DESCRIPTION - DESCRIPTORS
DESCRIPTORS: ACHING;DISCOMFORT;SORE
DESCRIPTORS: ACHING

## 2024-05-15 ASSESSMENT — PAIN - FUNCTIONAL ASSESSMENT: PAIN_FUNCTIONAL_ASSESSMENT: PREVENTS OR INTERFERES SOME ACTIVE ACTIVITIES AND ADLS

## 2024-05-15 ASSESSMENT — PAIN DESCRIPTION - FREQUENCY: FREQUENCY: INTERMITTENT

## 2024-05-15 ASSESSMENT — PAIN DESCRIPTION - LOCATION
LOCATION: BACK;HIP
LOCATION: HIP;BACK
LOCATION: BACK
LOCATION: HIP;LEG
LOCATION: BACK;HIP

## 2024-05-15 ASSESSMENT — PAIN SCALES - WONG BAKER: WONGBAKER_NUMERICALRESPONSE: NO HURT

## 2024-05-15 ASSESSMENT — PAIN DESCRIPTION - PAIN TYPE: TYPE: ACUTE PAIN;SURGICAL PAIN

## 2024-05-15 ASSESSMENT — PAIN DESCRIPTION - ONSET: ONSET: ON-GOING

## 2024-05-15 NOTE — PROGRESS NOTES
MET 5/15          Therapy Time   Individual Concurrent Group Co-treatment   Time In 0900         Time Out 1000         Minutes 60         Timed Code Treatment Minutes: 60 Minutes     Second Session Therapy Time:    Individual Concurrent Group Co-treatment   Time In  1330         Time Out  1400         Minutes  30            Timed Code Treatment Minutes: 30     Total Treatment Minutes:  90       Radha Elizabeth, OT

## 2024-05-15 NOTE — CARE COORDINATION
Referral to Melissa Goodman for RW and TTB, orders placed, writer will provided approved C9. Melissa aware of needed DME for Friday discharge.     Anahy Sands RN

## 2024-05-15 NOTE — PROGRESS NOTES
rolling  Transfers:  Surface: To bed, To chair with arms, Wheelchair, From chair with arms, From bed  Additional Factors: Verbal cues, Increased time to complete (cues for hand placement and maintaining back precautions)  Device: Walker  Sit>stand:  Assistance Level: Supervision  Skilled Clinical Factors: RW  Stand>sit:  Assistance Level: Supervision  Bed<>chair  Assistance Level: Modified independent  Skilled Clinical Factors: with RW EOB to w/c  Stand Pivot:     Lateral transfer:     Car transfer:  Assistance Level: Supervision  Skilled Clinical Factors: ambulation approach using RW, sit-in method  Ambulation:    Stairs:  Stair Height: 6''  Device: Bilateral handrails  Number of Stairs: 12 consecutive, extra time  Assistance Level: Supervision  Skilled Clinical Factors - Comments: reports pain with ascending, reciprocal pattern, slow kerwin.  Curb:     Wheelchair:      Occupational therapy:   Feeding  Assistance Level: Set-up  Skilled Clinical Factors: assisted with setting up/positioning breakfast tray while pt in bedside chair.  Grooming/Oral Hygiene     UE Bathing  Assistance Level: Supervision  Skilled Clinical Factors: seated on TTB  LE Bathing  Assistance Level: Supervision  Skilled Clinical Factors: seated on TTB, figure 4 to wash/dry BLE  UE Dressing  Assistance Level: Modified independent  Skilled Clinical Factors: don/doff pullover shirt  LE Dressing     Putting On/Taking Off Footwear  Equipment Provided: Reachers  Assistance Level: Modified independent  Skilled Clinical Factors: seated EOB with reacher to don/doff bilat slippers  Toileting  Assistance Level: Supervision  Skilled Clinical Factors: standing to void bladder    Speech therapy:    ADULT DIET; Regular    Body mass index is 24.41 kg/m².    Assessment and Plan:  Jd Huertas is a 59 year old male without significant past medical history who presented to [facility] on 4/22/24 as a trauma after falling 15 feet onto a metal beam on his back while at

## 2024-05-15 NOTE — PROGRESS NOTES
Physical Therapy  Facility/Department: [facility]  Rehabilitation Physical Therapy Treatment Note    NAME: Jd Huetras  : 1965 (59 y.o.)  MRN: [id number]  CODE STATUS: Full Code    Date of Service: 5/15/24       Restrictions:  Restrictions/Precautions: Fall Risk, Up as Tolerated, Surgical Protocols  Position Activity Restriction  Spinal Precautions: No Bending, No Lifting, No Twisting  Other position/activity restrictions: no BLT, no brace     SUBJECTIVE  Subjective  Subjective: Pt in bed & appears in a good mood, tolerable to therapy.        OBJECTIVE  Cognition  Overall Cognitive Status: Exceptions  Arousal/Alertness: Appropriate responses to stimuli  Following Commands: Follows all commands without difficulty  Attention Span: Appears intact  Memory: Appears intact  Safety Judgement: Decreased awareness of need for safety;Decreased awareness of need for assistance  Problem Solving: Decreased awareness of errors  Insights: Decreased awareness of deficits  Initiation: Does not require cues  Sequencing: Does not require cues  Orientation  Overall Orientation Status: Within Functional Limits  Orientation Level: Oriented X4    Functional Mobility  Bed Mobility  Overall Assistance Level: Modified Independent  Roll Left  Assistance Level: Modified independent  Roll Right  Assistance Level: Modified independent  Sit to Supine  Assistance Level: Modified independent  Supine to Sit  Assistance Level: Modified independent  Balance  Sitting Balance: Modified independent   Standing Balance: Modified independent  (with RW, unable to stand without UE support 2* pain)  Standing Balance  Time: ~5 min at a time with exercise, seated rest breaks between  Transfers  Surface: To bed;To chair with arms;Wheelchair;From chair with arms;From bed  Device: Walker  Sit to Stand  Assistance Level: Supervision  Skilled Clinical Factors: RW  Stand to Sit  Assistance Level: Supervision  Bed To/From Chair  Assistance Level: Modified

## 2024-05-15 NOTE — CARE COORDINATION
Weekly team care conference with interdisciplinary team on:05/15/24    Chart reviewed for length of stay. IP day # 9  Unit: ARU   Diagnosis and current status as per MD progress: Closed fracture of thoracic spine without spinal cord lesion, sequela   Consultants Following: n/a  Planned Discharge Date: 05/17/24  Durable medical equipment needed: RW, TTB  Discharge Plan: Outpatient PT/OT.Script provided to patient      59 yr old male. Dx:Closed fracture of thoracic spine without spinal cord lesion, sequela. Independent PLOF. Lives with son and daughter in a 2 level home with 1 step to enter. C9 filled out and signed by MD, faxed to [phone] w/supporting documents and orders. Writer received C9 approval from [facility]. Writer provided copy of approved services and DME to patient. Writer updated [facility] about [facility] approval for DME.    Anahy Sands RN

## 2024-05-16 PROCEDURE — 6360000002 HC RX W HCPCS: Performed by: STUDENT IN AN ORGANIZED HEALTH CARE EDUCATION/TRAINING PROGRAM

## 2024-05-16 PROCEDURE — 97535 SELF CARE MNGMENT TRAINING: CPT

## 2024-05-16 PROCEDURE — 1280000000 HC REHAB R&B

## 2024-05-16 PROCEDURE — 97110 THERAPEUTIC EXERCISES: CPT

## 2024-05-16 PROCEDURE — 6370000000 HC RX 637 (ALT 250 FOR IP): Performed by: STUDENT IN AN ORGANIZED HEALTH CARE EDUCATION/TRAINING PROGRAM

## 2024-05-16 PROCEDURE — 97530 THERAPEUTIC ACTIVITIES: CPT

## 2024-05-16 RX ADMIN — SENNOSIDES AND DOCUSATE SODIUM 1 TABLET: 50; 8.6 TABLET ORAL at 08:57

## 2024-05-16 RX ADMIN — ENOXAPARIN SODIUM 40 MG: 100 INJECTION SUBCUTANEOUS at 08:51

## 2024-05-16 RX ADMIN — ACETAMINOPHEN 1000 MG: 500 TABLET ORAL at 21:50

## 2024-05-16 RX ADMIN — METHOCARBAMOL 1000 MG: 500 TABLET ORAL at 08:50

## 2024-05-16 RX ADMIN — METHOCARBAMOL 1000 MG: 500 TABLET ORAL at 21:51

## 2024-05-16 RX ADMIN — SENNOSIDES AND DOCUSATE SODIUM 1 TABLET: 50; 8.6 TABLET ORAL at 21:51

## 2024-05-16 RX ADMIN — OXYCODONE 10 MG: 5 TABLET ORAL at 07:39

## 2024-05-16 RX ADMIN — POLYETHYLENE GLYCOL 3350 17 G: 17 POWDER, FOR SOLUTION ORAL at 08:57

## 2024-05-16 RX ADMIN — CARVEDILOL 6.25 MG: 6.25 TABLET, FILM COATED ORAL at 16:59

## 2024-05-16 RX ADMIN — GABAPENTIN 800 MG: 400 CAPSULE ORAL at 08:51

## 2024-05-16 RX ADMIN — Medication 5 MG: at 21:51

## 2024-05-16 RX ADMIN — ACETAMINOPHEN 1000 MG: 500 TABLET ORAL at 05:28

## 2024-05-16 RX ADMIN — ACETAMINOPHEN 1000 MG: 500 TABLET ORAL at 14:27

## 2024-05-16 RX ADMIN — POLYETHYLENE GLYCOL 3350 17 G: 17 POWDER, FOR SOLUTION ORAL at 21:50

## 2024-05-16 RX ADMIN — METHOCARBAMOL 1000 MG: 500 TABLET ORAL at 14:27

## 2024-05-16 RX ADMIN — OXYCODONE 10 MG: 5 TABLET ORAL at 21:50

## 2024-05-16 RX ADMIN — GABAPENTIN 800 MG: 400 CAPSULE ORAL at 21:51

## 2024-05-16 RX ADMIN — GABAPENTIN 800 MG: 400 CAPSULE ORAL at 14:27

## 2024-05-16 RX ADMIN — OXYCODONE 10 MG: 5 TABLET ORAL at 14:27

## 2024-05-16 RX ADMIN — CARVEDILOL 6.25 MG: 6.25 TABLET, FILM COATED ORAL at 08:51

## 2024-05-16 ASSESSMENT — PAIN SCALES - GENERAL
PAINLEVEL_OUTOF10: 8
PAINLEVEL_OUTOF10: 8
PAINLEVEL_OUTOF10: 7
PAINLEVEL_OUTOF10: 7
PAINLEVEL_OUTOF10: 3
PAINLEVEL_OUTOF10: 4
PAINLEVEL_OUTOF10: 7
PAINLEVEL_OUTOF10: 0
PAINLEVEL_OUTOF10: 0

## 2024-05-16 ASSESSMENT — PAIN DESCRIPTION - ONSET
ONSET: ON-GOING
ONSET: ON-GOING
ONSET: GRADUAL

## 2024-05-16 ASSESSMENT — PAIN DESCRIPTION - ORIENTATION
ORIENTATION: LOWER;MID;RIGHT;LEFT
ORIENTATION: LOWER
ORIENTATION: LOWER;RIGHT;LEFT
ORIENTATION: RIGHT;LEFT

## 2024-05-16 ASSESSMENT — PAIN DESCRIPTION - PAIN TYPE
TYPE: ACUTE PAIN;SURGICAL PAIN
TYPE: ACUTE PAIN;SURGICAL PAIN
TYPE: SURGICAL PAIN

## 2024-05-16 ASSESSMENT — PAIN DESCRIPTION - DESCRIPTORS
DESCRIPTORS: ACHING;DISCOMFORT;SORE
DESCRIPTORS: SHOOTING
DESCRIPTORS: ACHING;DISCOMFORT;SORE
DESCRIPTORS: ACHING;DISCOMFORT;THROBBING

## 2024-05-16 ASSESSMENT — PAIN DESCRIPTION - LOCATION
LOCATION: BACK
LOCATION: HIP
LOCATION: BACK
LOCATION: BACK

## 2024-05-16 ASSESSMENT — PAIN - FUNCTIONAL ASSESSMENT
PAIN_FUNCTIONAL_ASSESSMENT: ACTIVITIES ARE NOT PREVENTED
PAIN_FUNCTIONAL_ASSESSMENT: PREVENTS OR INTERFERES SOME ACTIVE ACTIVITIES AND ADLS

## 2024-05-16 ASSESSMENT — PAIN DESCRIPTION - FREQUENCY
FREQUENCY: INTERMITTENT

## 2024-05-16 ASSESSMENT — PAIN SCALES - WONG BAKER: WONGBAKER_NUMERICALRESPONSE: NO HURT

## 2024-05-16 NOTE — PROGRESS NOTES
for Long Term Goals : 14 days (5/19), updated 5/17 per team conference  Long Term Goal 1: Pt will complete bed mobility MOD I. GOAL MET using flat bed without rails  Long Term Goal 2: Pt will transfer with MOD I, using LRAD. GOAL MET using RW  Long Term Goal 3: Pt will ambulate >150 ft using LRAD with MOD I. GOAL MET using RW >300 ft at a time.  Long Term Goal 4: Assess and make goal for [place] Standing balance as appropriate. GOAL NOT MET, unable to assess 2* pain without UE support    Pt. Met 4/4 short term goals and 3/4 long term goals. Unable to complete [place] standing balance scale 2* pain without UE support.      Pt. Currently ambulates 300 feet with RW and MOD I  Up/down 12 steps with 2 rails, MOD I.   Up/down curb step with RW, MOD I.   Sit to/from stand with MOD I.   Bed mobility with MOD I.   Recommend OP PT in order to improve endurance, balance & mobility with decreased pain.   Pt. Safe to return home with assistance from family if needed.   Provided pt. with written HEP.  Electronically signed by Maria C Finney PT on 5/16/2024 at 3:39 PM

## 2024-05-16 NOTE — PLAN OF CARE
Pain is currently being managed with PO medications, see MAR. Staff assist with repositioning when needed and pillow support is provided for comfort.

## 2024-05-16 NOTE — PLAN OF CARE
Call light and phone within reach  Siderails up 2/4  Bed in lowest position, brakes on  Non-skid socks on  Instructed to use call light for assistance, verbalizes an understanding

## 2024-05-16 NOTE — CARE COORDINATION
CM spoke to Melissa Goodman via telephone about needed DME for tomorrows DC 5/17. Melissa will deliver RW and TTB in the am of 5/17. Melissa stated to put the C9 copy in Bobs folder in CM office. NEIL acknowledged.    Anahy Sands RN

## 2024-05-16 NOTE — PROGRESS NOTES
Dressing  Assistance Level: Modified independent  Skilled Clinical Factors: seated to thread BLE,  in standing to don/doff over hips  Putting On/Taking Off Footwear  Equipment Provided: Reachers  Assistance Level: Modified independent  Skilled Clinical Factors: seated EOB with reacher to don/doff bilat slippers  Toileting  Assistance Level: Modified independent  Skilled Clinical Factors: standing to void bladder; use of grab bars to sit on toilet  Toilet Transfers  Technique: Stand step  Equipment: Standard toilet;Grab bars  Additional Factors: Increased time to complete  Assistance Level: Modified independent  Skilled Clinical Factors: with RW  Tub/Shower Transfers  Type: Tub  Transfer From: Walker  Transfer To: Tub transfer bench  Additional Factors: Verbal cues  Assistance Level: Modified independent  Skilled Clinical Factors: use of grab bars          Functional Mobility  Device: Rolling walker  Activity: To/From therapy gym;To/From bathroom  Assistance Level: Modified independent  Skilled Clinical Factors: amb with RW > therapy gym; self-propelled wc > room  Transfers  Surface: To chair with arms;From chair with arms;Wheelchair  Additional Factors: Verbal cues;Hand placement cues;Increased time to complete  Device: Walker  Sit to Stand  Assistance Level: Modified independent  Skilled Clinical Factors: with RW, vbc's to lock wc brakes; IND with safety concerns  Stand to Sit  Assistance Level: Modified independent  Skilled Clinical Factors: good adherence to spinal px   OT Exercises  Dynamic Sitting Balance Exercises: Pt stood ~11 mins and ~6 mins to complete puzzle mod I. Pt demo'd improved tolerance to standing and dynamic reaching when completing  tabletop task.     Assessment  Assessment  Assessment: Pt progressed to standing ~11 mins during tabletop task, demo'ing improved endurance and standing tolerance. Pt mod I for TB bathing while seated on TTB. Pt mod I for grooming and TB dressing. Pt amb during  session IND with RW; mild safety concerns 2* providing vbc's to lock wc brakes. Provided pt with ice pack at end of session to alliviate L back pain, continue OT POC.  Activity Tolerance: Patient tolerated treatment well;Patient limited by pain  Discharge Recommendations: Home with assist PRN  OT Equipment Recommendations  Equipment Needed: Yes  Mobility Devices: ADL Assistive Devices  ADL Assistive Devices: Transfer Tub Bench;Hand-held Shower  Safety Devices  Safety Devices in place: Yes  Type of devices: Call light within reach;Nurse notified;Gait belt;Chair alarm in place;Left in chair  Restraints  Initially in place: No    Patient Education  Education  Education Given To: Patient  Education Provided: Plan of Care;Safety;ADL Function;Precautions;Mobility Training;Transfer Training;IADL Function;Energy Conservation;Equipment;DME/Home Modifications;Role of Therapy  Education Provided Comments: TTB transfer, safety when locking brakes, OP therapy following d/c, ECON strategies at home  Education Method: Verbal  Barriers to Learning: None  Education Outcome: Continued education needed;Verbalized understanding;Demonstrated understanding    Plan  Occupational Therapy Plan  Times Per Week: 5-7x/week  Current Treatment Recommendations: Strengthening;Balance training;Functional mobility training;Endurance training;Safety education & training;Pain management;Patient/Caregiver education & training;Self-Care / ADL;Home management training    Goals  Patient Goals   Patient goals : \"to get strong enough to go home\"  Short Term Goals  Time Frame for Short Term Goals: 7 days (5/12/24)- extend to d/c-  Short Term Goal 1: Pt will complete functional/toilet transfer SPV and LRAD- MET 5/13  Short Term Goal 2: Pt will complete LB dressing min A and AE- MET 5/10 pt SPV  Short Term Goal 3: Pt will complete LB bathing SBA- MET 5/10 pt SPV  Short Term Goal 4: Pt will tolerate 5 min stand with SPV-- MET 5/15 5:30 mod I  Short Term Goal 5:

## 2024-05-16 NOTE — PROGRESS NOTES
Physical Therapy  Facility/Department: [facility]  Rehabilitation Physical Therapy Treatment Note    NAME: Jd Huertas  : 1965 (59 y.o.)  MRN: [id number]  CODE STATUS: Full Code    Date of Service: 24       Restrictions:  Restrictions/Precautions: Fall Risk, Up as Tolerated, Surgical Protocols  Position Activity Restriction  Spinal Precautions: No Bending, No Lifting, No Twisting  Other position/activity restrictions: no BLT, no brace     SUBJECTIVE  Subjective  Subjective: Pt in recliner, agreeable to PT & reports \"ready to go tomorrow!\"           OBJECTIVE  Cognition  Overall Cognitive Status: Exceptions  Arousal/Alertness: Appropriate responses to stimuli  Following Commands: Follows all commands without difficulty  Attention Span: Appears intact  Memory: Appears intact  Safety Judgement: Decreased awareness of need for safety;Decreased awareness of need for assistance  Problem Solving: Decreased awareness of errors  Insights: Decreased awareness of deficits  Initiation: Does not require cues  Sequencing: Does not require cues  Orientation  Overall Orientation Status: Within Functional Limits  Orientation Level: Oriented X4    Functional Mobility  Bed Mobility  Overall Assistance Level: Modified Independent  Roll Left  Assistance Level: Independent  Roll Right  Assistance Level: Independent  Sit to Supine  Assistance Level: Modified independent  Skilled Clinical Factors: extra time, cues for logroll to prevent pain  Supine to Sit  Assistance Level: Modified independent  Scooting  Assistance Level: Modified independent  Balance  Sitting Balance: Independent  Standing Balance: Independent  Sit to Stand  Assistance Level: Independent  Skilled Clinical Factors: RW  Stand to Sit  Assistance Level: Independent  Bed To/From Chair  Technique: Stand step  Assistance Level: Independent  Car Transfer  Assistance Level: Independent  Skilled Clinical Factors: ambulation approach using RW, sit-in  method      Environmental Mobility  Ambulation  Surface: Level surface;Uneven surface  Device: Rolling walker  Distance: 500 ft using RW, varied surfaces with MOD I for extra time; no loss in balance  Assistance Level: Modified independent  Gait Deviations: Slow kerwin;Decreased weight shift bilateral;Narrow base of support  Skilled Clinical Factors: Overuse of UEs on RW while ambulating, pt reports \"to decrease back pain\"  Stairs  Stair Height: 6''  Device: Bilateral handrails  Number of Stairs: 12 consecutive, extra time; pain increased and requires an extended seated rest break.         PT Exercises  Exercise Equipment: LE strength and endurance training using SCI Fit x 15 min on gradual hills, 1.4 resistance with a 2 min cool down. Self pace ~90 steps per minute.    Second session:   Pt resting in recliner on arrival, agreeable to PT. Provided pt w/ written HEP and pt completes 20 reps each of seated ther-ex, 10 reps each of standing LE ther-ex w/ seated rest between. Pt left in recliner with needs in reach.     ASSESSMENT/PROGRESS TOWARDS GOALS    Assessment  Assessment: Pt motivated throughout PT session & has improved to MOD I for transfers and ambulation. Pt has met all PT goals and reports feeling ready for tomorrow's discharge.  Activity Tolerance: Patient tolerated treatment well  Discharge Recommendations: Outpatient PT  PT Equipment Recommendations  Equipment Needed: No  Other: pt reports having a w/c and RW, family acquired.    Goals  Patient Goals   Patient Goals : \"I want to be able to live without pain someday\"  Short Term Goals  Time Frame for Short Term Goals: 7 days (5/12)  Short Term Goal 1: Pt will complete bed mobility SUP.GOAL MET  Short Term Goal 2: Pt will transfer with SUP. GOAL MET  Short Term Goal 3: Pt will ambulate with LRAD >150 ft SUP. GOAL MET, SUP using RW  Short Term Goal 4: Pt will manage 12 stairs with SBA. GOAL MET  Long Term Goals  Time Frame for Long Term Goals : 14 days

## 2024-05-16 NOTE — PROGRESS NOTES
Occupational Therapy  Discharge Summary     Name:Jd Huertas  MRN:[id number]  :1965  Treatment Diagnosis: Impaired mobility  Diagnosis: Per chart: \"Jd Huertas is a 59 year old male without significant past medical history who presented to [facility] on 24 as a trauma after falling 15 feet onto a metal beam on his back while at work. He was found to have T5 and T8 compression fractures, T12 burst fracture with retropulsion causing spinal canal stenosis. Neurosurgery was consulted and on 24 he underwent T10-L2 PDFF. Incidentally a AAA was found requiring no surgical intervention. He was started on a BB and recommended for follow up CT in 6 months. CT head also showed small chronic lacunar infarct within the right caudate. Post operative course was complicated by ileus requiring NG for decompression which has since been removed. He was admitted to [facility] on 24 due to functional deficits below his baseline. Today he reports continued back pain which he reports is currently a 6 on a scale from 1-10. He reports some numbness over his left flank, buttock, and upper thigh. He denies focal weakness. His last bowel movement was Saturday. He denies urinary complaints. He lives in a 2 level house with 1 [facility] with his two teenaged children.\"    Restrictions/Precautions  Restrictions/Precautions: Fall Risk, Up as Tolerated, Surgical Protocols  Required Braces or Orthoses?: No           Position Activity Restriction  Spinal Precautions: No Bending, No Lifting, No Twisting  Other position/activity restrictions: no BLT, no brace     Goals:   Patient Goals   Patient goals : \"to get strong enough to go home\"  Short Term Goals  Time Frame for Short Term Goals: 7 days (24)- extend to d/c-  Short Term Goal 1: Pt will complete functional/toilet transfer SPV and LRAD- MET   Short Term Goal 2: Pt will complete LB dressing min A and AE- MET 5/10 pt SPV  Short Term Goal 3: Pt will complete LB bathing SBA- MET 5/10 pt  Acute Onset Mental Status Change  Is there evidence of an acute change in mental status from the patient's baseline?  [x] 0. No [] 1. Yes    B. Inattention: Did the patient have difficulty focusing attention, for example being easily distractible or having difficulty keeping track of what was being said?  [x] 0. Behavior not present    [] 1. Behavior continuously present, does not fluctuate   [] 2. Behavior present, fluctuates (comes and goes, changes in severity)    C. Disorganized thinking: Was the patient's thinking disorganized or incoherent (rambling or irrelevant conversation, unclear or illogical flow of ideas, or unpredictable switching from subject to subject)?  [x] 0. Behavior not present    [] 1. Behavior continuously present, does not fluctuate   [] 2. Behavior present, fluctuates (comes and goes, changes in severity)    D. Altered level of consciousness: Did the patient have altered level of consciousness as indicated by any of the following criteria?  Vigilant: startled easily to any sound or touch  Lethargic: repeatedly dozed off when being asked questions, but responded to voice or touch  Stuporous: very difficult to arouse and keep aroused for the interview  Comatose: could not be aroused  [x] 0. Behavior not present    [] 1. Behavior continuously present, does not fluctuate   [] 2. Behavior present, fluctuates (comes and goes, changes in severity)      Electronically signed by Radha Elizabeth OT on 5/16/2024 at 10:47 AM

## 2024-05-16 NOTE — PROGRESS NOTES
Jd Huertas  5/16/2024  [id number]    Chief Complaint: Closed fracture of thoracic spine without spinal cord lesion, sequela    Subjective:   Seen in his room this morning.  No new complaints overnight.  He is planning on going home tomorrow with home health.  He feels like he has progressed well enough to be able to discharge home.  No new pain overnight.  He was able to sleep well last night.    ROS: denies f/c, n/v, cp, sob    Objective:  Patient Vitals for the past 24 hrs:   BP Temp Temp src Pulse Resp SpO2   05/16/24 0845 128/76 98.1 °F (36.7 °C) Oral 76 16 96 %   05/16/24 0809 -- -- -- -- 16 --   05/16/24 0739 -- -- -- -- 16 --   05/15/24 1945 130/82 98.2 °F (36.8 °C) Oral 82 18 98 %   05/15/24 1745 137/80 -- -- 77 -- --   05/15/24 1532 -- -- -- -- 18 --       Gen: No distress, pleasant. Supine in bed  HEENT: Normocephalic, atraumatic.   CV: RRR  Resp: No respiratory distress, lungs CTAB   Abd: Soft, nondistended  Ext: No edema.   Neuro: Alert, oriented, appropriately interactive.   Psych: appropriate mood and affect    Wt Readings from Last 3 Encounters:   05/13/24 79.4 kg (175 lb)       Laboratory data:   Lab Results   Component Value Date    WBC 6.6 05/15/2024    HGB 11.1 (L) 05/15/2024    HCT 34.2 (L) 05/15/2024    MCV 84.5 05/15/2024     (H) 05/15/2024       Lab Results   Component Value Date/Time     05/15/2024 06:06 AM    K 4.4 05/15/2024 06:06 AM     05/15/2024 06:06 AM    CO2 27 05/15/2024 06:06 AM    BUN 15 05/15/2024 06:06 AM    CREATININE <0.5 05/15/2024 06:06 AM    GLUCOSE 102 05/15/2024 06:06 AM    CALCIUM 8.7 05/15/2024 06:06 AM        Therapy progress:  Physical therapy:  Bed Mobility:  Overall Assistance Level: Modified Independent  Sit>supine:  Assistance Level: Modified independent  Skilled Clinical Factors: extra time, cues for logroll to prevent pain  Supine>sit:  Assistance Level: Modified independent  Skilled Clinical Factors: via log rolling  Transfers:  Surface: To

## 2024-05-17 VITALS
OXYGEN SATURATION: 95 % | RESPIRATION RATE: 18 BRPM | HEIGHT: 71 IN | SYSTOLIC BLOOD PRESSURE: 135 MMHG | DIASTOLIC BLOOD PRESSURE: 77 MMHG | WEIGHT: 175 LBS | TEMPERATURE: 98 F | HEART RATE: 71 BPM | BODY MASS INDEX: 24.5 KG/M2

## 2024-05-17 PROCEDURE — 6360000002 HC RX W HCPCS: Performed by: STUDENT IN AN ORGANIZED HEALTH CARE EDUCATION/TRAINING PROGRAM

## 2024-05-17 PROCEDURE — 6370000000 HC RX 637 (ALT 250 FOR IP): Performed by: STUDENT IN AN ORGANIZED HEALTH CARE EDUCATION/TRAINING PROGRAM

## 2024-05-17 RX ORDER — OXYCODONE HYDROCHLORIDE 5 MG/1
5 TABLET ORAL EVERY 6 HOURS PRN
Qty: 28 TABLET | Refills: 0 | Status: SHIPPED | OUTPATIENT
Start: 2024-05-17 | End: 2024-05-24

## 2024-05-17 RX ADMIN — OXYCODONE 10 MG: 5 TABLET ORAL at 06:10

## 2024-05-17 RX ADMIN — GABAPENTIN 800 MG: 400 CAPSULE ORAL at 08:26

## 2024-05-17 RX ADMIN — OXYCODONE 10 MG: 5 TABLET ORAL at 10:27

## 2024-05-17 RX ADMIN — METHOCARBAMOL 1000 MG: 500 TABLET ORAL at 08:26

## 2024-05-17 RX ADMIN — ACETAMINOPHEN 1000 MG: 500 TABLET ORAL at 06:10

## 2024-05-17 RX ADMIN — POLYETHYLENE GLYCOL 3350 17 G: 17 POWDER, FOR SOLUTION ORAL at 08:25

## 2024-05-17 RX ADMIN — ENOXAPARIN SODIUM 40 MG: 100 INJECTION SUBCUTANEOUS at 08:25

## 2024-05-17 RX ADMIN — CARVEDILOL 6.25 MG: 6.25 TABLET, FILM COATED ORAL at 08:26

## 2024-05-17 RX ADMIN — SENNOSIDES AND DOCUSATE SODIUM 1 TABLET: 50; 8.6 TABLET ORAL at 08:26

## 2024-05-17 ASSESSMENT — PAIN SCALES - GENERAL
PAINLEVEL_OUTOF10: 7
PAINLEVEL_OUTOF10: 8
PAINLEVEL_OUTOF10: 7
PAINLEVEL_OUTOF10: 7

## 2024-05-17 ASSESSMENT — PAIN DESCRIPTION - FREQUENCY: FREQUENCY: INTERMITTENT

## 2024-05-17 ASSESSMENT — PAIN DESCRIPTION - ONSET: ONSET: GRADUAL

## 2024-05-17 ASSESSMENT — PAIN DESCRIPTION - LOCATION
LOCATION: HIP;BACK
LOCATION: BACK;LEG
LOCATION: BACK

## 2024-05-17 ASSESSMENT — PAIN DESCRIPTION - ORIENTATION
ORIENTATION: MID;LOWER;RIGHT;LEFT
ORIENTATION: RIGHT;LEFT

## 2024-05-17 ASSESSMENT — PAIN DESCRIPTION - DESCRIPTORS
DESCRIPTORS: ACHING;DISCOMFORT;SORE
DESCRIPTORS: ACHING
DESCRIPTORS: ACHING

## 2024-05-17 ASSESSMENT — PAIN - FUNCTIONAL ASSESSMENT: PAIN_FUNCTIONAL_ASSESSMENT: PREVENTS OR INTERFERES SOME ACTIVE ACTIVITIES AND ADLS

## 2024-05-17 ASSESSMENT — PAIN DESCRIPTION - PAIN TYPE: TYPE: ACUTE PAIN;SURGICAL PAIN

## 2024-05-17 NOTE — CARE COORDINATION
Case Management Discharge Summary  [facility] - Acute Rehab Unit    Patient:Jd Huertas     Rehab Dx/Hx: Closed fracture of thoracic spine without spinal cord lesion, sequela [S22.009S]       Today's Date: 5/17/2024    Discharge to:  Home- OP therapy   Pre-certification completed:  [] No       [] Yes     [x] N/A   Hospital Exemption Notification (HENS) completed:  [] No       [] Yes     [x] N/A   IMM given:  na       New Durable Medical Equipment ordered/agency:  [facility]- RW and TTB   Transportation:  Medical Transport explained to pt/family. Pt/family voice no agency preference.    Agency used:   time:  Ambulance form completed: [] No       [] Yes   [x] N/A       Confirmed discharge plan with:  Patient: [] No       [x] Yes  Family:  [] No       [x] Yes      Name:son  Contact number: Pt has notified- is en route  Facility/Agency, name:[facility]  [facility] faxed Plan OP therapy- script has been provided to pt. Has contact info for [facility] OP Therapy  Phone number for report to facility: [facility]  RN, name: Herbert       Has lack of transportation kept you from medical appointments, meetings, work, or ADL's? [] Yes, it has kept me from medical appointments or from getting my meds  [] Yes, It has kept me from non-medical meetings, appointments, work, or getting things I need  [x] No  [] Pt unable to respond  [] Pt declines to respond     How often do you need to have someone help you when you read instructions, pamphlets, or other written material from your doctor or pharmacy? [] Never                             [] Always  [x] Rarely                            [] Patient declines to respond  [] Sometimes                    [] Patient unable to respond  [] Often       Note: Discharging nurse to complete J[facility], reconcile AVS, and place final copy with patient's discharge packet. RN to ensure that written prescriptions for  Level II medications are sent with patient to the facility as per protocol.

## 2024-05-17 NOTE — PROGRESS NOTES
ACUTE REHAB UNIT: DISCHARGE  [facility]    Patient:Jd Huertas     Rehab Dx/Hx: Closed fracture of thoracic spine without spinal cord lesion, sequela [S22.009S]   :1965  MRN:[id number]  Date of Admit: 2024   Date of Discharge: 2024    Subjective:   Order for patient discharge.  Reviewed discharge summary (AVS) with patient including medications, physical instructions (safety) and diet. Pt in stable condition. Prescriptions sent to [facility] since closer for patient to  since he has no funds available here.    Reconciled Medication List - Patient:   Medications were reviewed by RN at time of discharge with patient and/or family:  []  Via EMR   []  Via health information exchange  []  Verbal (e.g. in person, telephone, video conferencing)  [x]  Paper-based (e.g. fax, copies, printouts)   []  Other Methods (e.g. texting, email, CDs)    Reconciled Medication List - Subsequent provider:   [] No, current reconciled medication list not provided to the subsequent provider.  [x] Yes, current reconciled medication list provided to the subsequent provider.   [] Via EMR    [] Via health information exchange  [] Verbal (e.g. in person, telephone, video conferencing)  [x] Paper-based (e.g. fax, copies, printouts)   [] Other Methods (e.g. texting, email, CDs)    Discharge disposition:  Pt was discharged via:  [x]  Wheelchair  []  Stretcher  []  Safe ambulation and use of assistive device  []  Other:     Pt was discharged to:  [x]  Private car  []  Transportation service to next destination  []  Other:     Discharge destination:  [x]  Home  []  Skilled Nursing Facility  []  Long Term Care  []  Other:        Discharge BIMS:  Number of word repeated after first attempt:  []  0. None []  1. One []  2. Two [x]  3. Three    Able to report correct year:  []  0. Missed by >5 years, or no answer  []  1. Missed by 2-5 years  []  2. Missed by 1 year  [x]  3. Correct    Able to report

## 2024-05-17 NOTE — PROGRESS NOTES
Jd Huertas  5/17/2024  [id number]    Chief Complaint: Closed fracture of thoracic spine without spinal cord lesion, sequela    Subjective:   Seen in his room this morning.  No new complaints overnight.  He is ready for discharge today.  He feels like he has made enough progress to go home and do well.  Continue therapy at home  ROS: denies f/c, n/v, cp, sob    Objective:  Patient Vitals for the past 24 hrs:   BP Temp Temp src Pulse Resp SpO2   05/17/24 0815 135/77 98 °F (36.7 °C) Oral 71 18 95 %   05/16/24 2145 (!) 147/72 98 °F (36.7 °C) Oral 81 18 97 %   05/16/24 1659 (!) 149/84 -- -- 76 -- --   05/16/24 1457 -- -- -- -- 18 --   05/16/24 1427 -- -- -- -- 18 --       Gen: No distress, pleasant. Supine in bed  HEENT: Normocephalic, atraumatic.   CV: RRR  Resp: No respiratory distress, lungs CTAB   Abd: Soft, nondistended  Ext: No edema.   Neuro: Alert, oriented, appropriately interactive.   Psych: appropriate mood and affect    Wt Readings from Last 3 Encounters:   05/13/24 79.4 kg (175 lb)       Laboratory data:   Lab Results   Component Value Date    WBC 6.6 05/15/2024    HGB 11.1 (L) 05/15/2024    HCT 34.2 (L) 05/15/2024    MCV 84.5 05/15/2024     (H) 05/15/2024       Lab Results   Component Value Date/Time     05/15/2024 06:06 AM    K 4.4 05/15/2024 06:06 AM     05/15/2024 06:06 AM    CO2 27 05/15/2024 06:06 AM    BUN 15 05/15/2024 06:06 AM    CREATININE <0.5 05/15/2024 06:06 AM    GLUCOSE 102 05/15/2024 06:06 AM    CALCIUM 8.7 05/15/2024 06:06 AM        Therapy progress:  Physical therapy:  Bed Mobility:  Overall Assistance Level: Modified Independent  Sit>supine:  Assistance Level: Modified independent  Skilled Clinical Factors: extra time, cues for logroll to prevent pain  Supine>sit:  Assistance Level: Modified independent  Skilled Clinical Factors: via log rolling  Transfers:  Surface: To bed, To chair with arms, Wheelchair, From chair with arms, From bed  Additional Factors: Verbal  cues, Increased time to complete (cues for hand placement and maintaining back precautions)  Device: Walker  Sit>stand:  Assistance Level: Independent  Skilled Clinical Factors: RW  Stand>sit:  Assistance Level: Independent  Bed<>chair  Technique: Stand step  Assistance Level: Independent  Skilled Clinical Factors: with RW EOB to w/c  Stand Pivot:     Lateral transfer:     Car transfer:  Assistance Level: Independent  Skilled Clinical Factors: ambulation approach using RW, sit-in method  Ambulation:    Stairs:  Stair Height: 6''  Device: Bilateral handrails  Number of Stairs: 12 consecutive, extra time; pain increased and requires an extended seated rest break.  Assistance Level: Supervision  Skilled Clinical Factors - Comments: reports pain with ascending, reciprocal pattern, slow kerwin.  Curb:     Wheelchair:      Occupational therapy:   Feeding  Assistance Level: Set-up  Skilled Clinical Factors: assisted with setting up/positioning breakfast tray while pt in bedside chair.  Grooming/Oral Hygiene  Assistance Level: Modified independent  Skilled Clinical Factors: seated on TTB to shave, standing for oral care  UE Bathing  Assistance Level: Modified independent  Skilled Clinical Factors: seated on TTB  LE Bathing  Assistance Level: Modified independent  Skilled Clinical Factors: seated on TTB, figure 4 to wash/dry BLE  UE Dressing  Assistance Level: Modified independent  Skilled Clinical Factors: don/doff pullover shirt  LE Dressing  Assistance Level: Modified independent  Skilled Clinical Factors: seated to thread BLE,  in standing to don/doff over hips  Putting On/Taking Off Footwear  Equipment Provided: Reachers  Assistance Level: Modified independent  Skilled Clinical Factors: seated EOB with reacher to don/doff bilat slippers  Toileting  Assistance Level: Modified independent  Skilled Clinical Factors: standing to void bladder; use of grab bars to sit on toilet    Speech therapy:    ADULT DIET;

## 2024-05-17 NOTE — PROGRESS NOTES
IRF TORI Discharge Assessment  [facility] Acute Rehab Unit    Patient:Jd Huertas     Rehab Dx/Hx: Closed fracture of thoracic spine without spinal cord lesion, sequela [S22.009S]   :1965  MRN:[id number]  Date of Admit: 2024     Pain Effect on Sleep:  Over the past 5 days, how much of the time has pain made it hard for you to sleep at night?  []  0. Does not apply - I have not had any pain or hurting in the past 5 days  [x]  1. Rarely or not at all  []  2. Occasionally  []  3. Frequently  []  4. Almost constantly  []  8. Unable to answer    Over the past 5 days, how often have you limited your participation in rehabilitation therapy sessions due to pain?  []  0. Does not apply - I have not received rehabilitation therapy in the past 5 days  [x]  1. Rarely or not at all  []  2. Occasionally  []  3. Frequently  []  4. Almost constantly  []  8. Unable to answer    Pain Interference with Day-to-Day Activities:  Over the past 5 days, how often have you limited your day-to-day activities (excluding rehabilitation therapy session)?  [x]  1. Rarely or not at all  []  2. Occasionally  []  3. Frequently  []  4. Almost constantly  []  8. Unable to answer      High-Risk Drug Classes: Use and Indication   Is taking: Check if the pt is taking any medications by pharmacological classification  Indication noted: If column 1 is checked, check if there is an indication noted for all meds in the drug class Is taking  (check all that apply) Indication noted (check all that apply)   Antipsychotic [] []   Anticoagulant [] []   Antibiotic [] []   Opioid [x] [x]   Antiplatelet [] []   Hypoglycemic (including insulin) [] []   None of the above [] []     Special Treatments, Procedures, and Programs   Check all of the following treatments, procedures, and programs that apply on discharge.  On discharge (check all that apply)   Cancer Treatments    A1. Chemotherapy []   A2. IV []   A3. Oral []   A10. Other []   B1. Radiation []

## 2024-05-18 NOTE — DISCHARGE SUMMARY
Physical Medicine & Rehabilitation  Discharge Summary     Patient Identification:  Jd Huertas  : 1965  Admit date: 2024  Discharge date: 2024  Attending provider: No att. providers found        Primary care provider: No primary care provider on file.     Discharge Diagnoses:   Patient Active Problem List   Diagnosis    Closed fracture of thoracic spine without spinal cord lesion, sequela       Discharge Functional Status:      Physical therapy:  Supine to Sit: Independent  Sit to Supine: Independent      Sit to Stand: Independent  Chair/Bed to Chair Transfer: Independent  Car Transfer: Setup or clean-up assistance     Ambulation 10 ft: Independent  Ambulation 50 ft: Independent  Ambulation 150 ft: Independent  Stairs - 1 Step: Independent  Stairs - 4 Step: Independent  Stairs - 12 Step: Independent    Occupational therapy:  Eating: Independent  Oral Hygiene: Independent  Bathing: Independent  Upper Body Dressing: Independent  Lower Body Dressing: Independent     Toilet Transfer: Independent  Toilet Hygiene: Independent    Speech therapy:         Inpatient Rehabilitation Course:   Jd Huertas is a 59 y.o. male admitted to inpatient rehabilitation on 2024 s/p Closed fracture of thoracic spine without spinal cord lesion, sequela.  The patient participated in an aggressive multidisciplinary inpatient rehabilitation program involving 3 hours of therapy per day, at least 5 days per week.     Discharge Exam:  Patient Vitals for the past 24 hrs:   BP Temp Temp src Pulse Resp SpO2   24 1057 -- -- -- -- 18 --   24 0815 135/77 98 °F (36.7 °C) Oral 71 18 95 %     Constitutional: Pleasant, no distress  Head: Normocephalic  Eyes: Conjunctiva noninjected  Pulm: CTA bilat  CV: No murmurs noted  Abd: Soft, nontender  Ext: No edema  Neuro: Alert, fully oriented, appropriate   MSK: No joint abnormalities noted     Significant Diagnostics:   Lab Results   Component Value Date    CREATININE <0.5 (L)  05/15/2024    BUN 15 05/15/2024     05/15/2024    K 4.4 05/15/2024     05/15/2024    CO2 27 05/15/2024       Lab Results   Component Value Date    WBC 6.6 05/15/2024    HGB 11.1 (L) 05/15/2024    HCT 34.2 (L) 05/15/2024    MCV 84.5 05/15/2024     (H) 05/15/2024       Disposition:  home    Condition at Discharge:    Stable    Follow-up:  See after visit summary from hospitalization    Discharge Medications:     Medication List        START taking these medications      carvedilol 6.25 MG tablet  Commonly known as: COREG  Take 1 tablet by mouth 2 times daily (with meals)     gabapentin 400 MG capsule  Commonly known as: NEURONTIN  Take 2 capsules by mouth 3 times daily for 30 days.     Methocarbamol 1000 MG Tabs  Take 1,000 mg by mouth 3 times daily     oxyCODONE 5 MG immediate release tablet  Commonly known as: ROXICODONE  Take 1 tablet by mouth every 6 hours as needed for Pain for up to 7 days. Max Daily Amount: 20 mg     polyethylene glycol 17 g packet  Commonly known as: GLYCOLAX  Take 1 packet by mouth 2 times daily     sennosides-docusate sodium 8.6-50 MG tablet  Commonly known as: SENOKOT-S  Take 1 tablet by mouth in the morning and at bedtime               Where to Get Your Medications        These medications were sent to [facility] - [address] - P [phone] - F [phone]  [address] [place] [address]      Phone: [phone]   carvedilol 6.25 MG tablet  gabapentin 400 MG capsule  Methocarbamol 1000 MG Tabs  polyethylene glycol 17 g packet  sennosides-docusate sodium 8.6-50 MG tablet       These medications were sent to [facility] [address] [place] [address] [address] [address] - P [phone] - F [phone]  [address] [facility] [place] [address]      Phone: [phone]   oxyCODONE 5 MG immediate release tablet         I spent over 35 minutes on this discharge encounter between counseling, coordination of care, and medication